# Patient Record
Sex: FEMALE | Race: WHITE | NOT HISPANIC OR LATINO | Employment: FULL TIME | URBAN - METROPOLITAN AREA
[De-identification: names, ages, dates, MRNs, and addresses within clinical notes are randomized per-mention and may not be internally consistent; named-entity substitution may affect disease eponyms.]

---
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
## 2022-01-11 ENCOUNTER — OFFICE VISIT (OUTPATIENT)
Dept: PSYCHOLOGY | Facility: CLINIC | Age: 22
End: 2022-01-11
Payer: COMMERCIAL

## 2022-01-11 DIAGNOSIS — F50.01 ANOREXIA NERVOSA, RESTRICTING TYPE: Primary | ICD-10-CM

## 2022-01-11 DIAGNOSIS — R45.89 DEPRESSED MOOD: ICD-10-CM

## 2022-01-11 PROCEDURE — 90834 PSYTX W PT 45 MINUTES: CPT | Mod: 95,,, | Performed by: PSYCHOLOGIST

## 2022-01-11 PROCEDURE — 90834 PR PSYCHOTHERAPY W/PATIENT, 45 MIN: ICD-10-PCS | Mod: 95,,, | Performed by: PSYCHOLOGIST

## 2022-01-21 NOTE — PROGRESS NOTES
"  Name: Lida Reed YOB: 2000   Gender: Female Age: 21 y.o.   School: [employer]  Date of Appointment: 1/11/2022   Grade: college [employer] Race/Ethnicity: White//White     40-minute session of individual therapy ([id number]) was completed with patient Lida.  She arrived on time.    Reason for Referral  Lida's parents, Mr. Collins and Mrs. Vee Reed, are seeking consultation regarding ongoing needs for Lida.  Lida is known to this writer through two previous psychoeducational evaluations, as well as periodic outpatient therapy sessions with Lida and/or her parents.  Lida has a history of deceitful behavior, and  And Mrs. Reed hypothesize that the function of this behavior is to avoid putting in the necessary effort to complete something that is challenging for her (e.g., a school project or assignment), or at times, to be able to do something that she believes she will not be able to do if she tells the truth about it (e.g., going to a party).   And Mrs. Reed believe that Lida is comfortable doing the "bare minimum" and with being average; they believe that she is not exerting as much effort, particularly toward academic tasks and cross country, as she is capable of.  Indeed Lida also reports that she often "gets bored" when things take a long time, and she feels as though she "just wants them to be over with," such as cross country meets and the ACT (for which she receives accommodations to take one of each of the four parts over four days).  Therapeutic conversations are centered around helping Lida continue to develop insight and self-regulatory skills to translate goals into actionable steps.    As of 2/6/2020 appointment, Lida was referred for CBT by Dr. Mary Walsh due to concerns about mild symptoms of depression, including insomnia, low energy/fatigue, irritability, and difficulty concentrating.  Previous treatment has focused more on behavioral change geared " "toward academic behaviors and executive functioning, as well as family relationships.  Goal for treatment at this time is reduction of depression symptomatology through CBT.    As of October 2021, Lida is participating in family based therapy (FBT) with Dr. Echo Giles at the [facility] for anorexia nervosa, restricting type with potential purging behavior (vomiting of unknown etiology).  She sought out treatment with this writer for supportive care related to the negative impacts on her mood due to the intensity of FBT.  This writer is collaborating care with Dr. Giles.    Content of Current Session  Met with Lida individually for the entirety of the session. She arrived on time for her scheduled appointment.  Lida reported that she has been doing "very good" since her last session. She was able to take a trip to the beach with her boyfriend and his family and has increased her level of socialization more generally, and feels that she has been incredibly successful with maintaining her progress with recovery while also feeling more fulfilled and satisfied to feel as though she is getting closer to her baseline level of social functioning. She discussed her plan for returning to college in person and to staying at her apartment occasionally. Agreed that she can transition to meeting every other week for the next two months because she feels as though her mental health has improved.       The following diagnoses are the reason for psychological intervention:    ICD-10-CM ICD-9-CM   1. Anorexia nervosa, restricting type  F50.01 307.1   2. Depressed mood  R45.89 799.29       Treatment approach: cognitive behavioral therapy  Treatment modality: individual therapy   Plan: Next appointment on 1/25/2022    The patient location is: [place]  The chief complaint leading to consultation is: anorexia nervosa    Visit type: audiovisual    Face to Face time with patient: 40 minutes  45 minutes of total time spent on " the encounter, which includes face to face time and non-face to face time preparing to see the patient (eg, review of tests), Obtaining and/or reviewing separately obtained history, Documenting clinical information in the electronic or other health record, Independently interpreting results (not separately reported) and communicating results to the patient/family/caregiver, or Care coordination (not separately reported).     Each patient to whom he or she provides medical services by telemedicine is:  (1) informed of the relationship between the physician and patient and the respective role of any other health care provider with respect to management of the patient; and (2) notified that he or she may decline to receive medical services by telemedicine and may withdraw from such care at any time.

## 2022-01-25 ENCOUNTER — OFFICE VISIT (OUTPATIENT)
Dept: PSYCHOLOGY | Facility: CLINIC | Age: 22
End: 2022-01-25
Payer: COMMERCIAL

## 2022-01-25 DIAGNOSIS — F50.01 ANOREXIA NERVOSA, RESTRICTING TYPE: Primary | ICD-10-CM

## 2022-01-25 PROCEDURE — 90834 PR PSYCHOTHERAPY W/PATIENT, 45 MIN: ICD-10-PCS | Mod: 95,,, | Performed by: PSYCHOLOGIST

## 2022-01-25 PROCEDURE — 90834 PSYTX W PT 45 MINUTES: CPT | Mod: 95,,, | Performed by: PSYCHOLOGIST

## 2022-01-27 NOTE — PROGRESS NOTES
"  Name: Lida Reed YOB: 2000   Gender: Female Age: 21 y.o.   School: [employer]  Date of Appointment: 1/11/2022   Grade: college [employer] Race/Ethnicity: White//White     40-minute session of individual therapy (22635) was completed with patient Lida.  She arrived on time.    Reason for Referral  Lida's parents, Mr. Collins and Mrs. Vee Reed, are seeking consultation regarding ongoing needs for Lida.  Lida is known to this writer through two previous psychoeducational evaluations, as well as periodic outpatient therapy sessions with Lida and/or her parents.  Lida has a history of deceitful behavior, and  And Mrs. Reed hypothesize that the function of this behavior is to avoid putting in the necessary effort to complete something that is challenging for her (e.g., a school project or assignment), or at times, to be able to do something that she believes she will not be able to do if she tells the truth about it (e.g., going to a party).   And Mrs. Reed believe that Lida is comfortable doing the "bare minimum" and with being average; they believe that she is not exerting as much effort, particularly toward academic tasks and cross country, as she is capable of.  Indeed Lida also reports that she often "gets bored" when things take a long time, and she feels as though she "just wants them to be over with," such as cross country meets and the ACT (for which she receives accommodations to take one of each of the four parts over four days).  Therapeutic conversations are centered around helping Lida continue to develop insight and self-regulatory skills to translate goals into actionable steps.    As of 2/6/2020 appointment, Lida was referred for CBT by Dr. Mary Walsh due to concerns about mild symptoms of depression, including insomnia, low energy/fatigue, irritability, and difficulty concentrating.  Previous treatment has focused more on behavioral change geared " toward academic behaviors and executive functioning, as well as family relationships.  Goal for treatment at this time is reduction of depression symptomatology through CBT.    As of October 2021, Lida is participating in family based therapy (FBT) with Dr. Echo Giles at the [facility] for anorexia nervosa, restricting type with potential purging behavior (vomiting of unknown etiology).  She sought out treatment with this writer for supportive care related to the negative impacts on her mood due to the intensity of FBT.  This writer is collaborating care with Dr. Giles.    Content of Current Session  Met with Lida individually for the entirety of the session. She arrived on time for her scheduled appointment.  Lida shared about a recent 3-night stay at her apartment in [place]. She felt that she did well there overall and enjoyed seeing her friends. However, she also reported feeling homesick and missed her mother, which was a surprise to her. She shared that she also realized that she could send falsified pictures of her meals, and was mildly tempted to do so, but ultimately decided not to. She expressed appropriate insight that she does better when she has to FaceTime or send a video of her meals when she is away from her parents. Lida expressed her intention to share this information with her parents, and she agreed also for this writer to send that prompt to her parents and FBT therapist, Dr. Giles, via email. Praised patient for being open and honest about this.      The following diagnoses are the reason for psychological intervention:    ICD-10-CM ICD-9-CM   1. Anorexia nervosa, restricting type  F50.01 307.1       Treatment approach: cognitive behavioral therapy  Treatment modality: individual therapy   Plan: Next appointment on 2/14/22    The patient location is: [place]  The chief complaint leading to consultation is: anorexia nervosa    Visit type: audiovisual    Face to Face time  with patient: 40 minutes  45 minutes of total time spent on the encounter, which includes face to face time and non-face to face time preparing to see the patient (eg, review of tests), Obtaining and/or reviewing separately obtained history, Documenting clinical information in the electronic or other health record, Independently interpreting results (not separately reported) and communicating results to the patient/family/caregiver, or Care coordination (not separately reported).     Each patient to whom he or she provides medical services by telemedicine is:  (1) informed of the relationship between the physician and patient and the respective role of any other health care provider with respect to management of the patient; and (2) notified that he or she may decline to receive medical services by telemedicine and may withdraw from such care at any time.

## 2022-02-14 ENCOUNTER — PATIENT MESSAGE (OUTPATIENT)
Dept: PSYCHOLOGY | Facility: CLINIC | Age: 22
End: 2022-02-14
Payer: COMMERCIAL

## 2022-03-02 ENCOUNTER — PATIENT MESSAGE (OUTPATIENT)
Dept: PSYCHIATRY | Facility: CLINIC | Age: 22
End: 2022-03-02
Payer: COMMERCIAL

## 2022-03-14 ENCOUNTER — OFFICE VISIT (OUTPATIENT)
Dept: PSYCHOLOGY | Facility: CLINIC | Age: 22
End: 2022-03-14
Payer: COMMERCIAL

## 2022-03-14 DIAGNOSIS — F50.01 ANOREXIA NERVOSA, RESTRICTING TYPE: Primary | ICD-10-CM

## 2022-03-14 PROCEDURE — 90834 PSYTX W PT 45 MINUTES: CPT | Mod: 95,,, | Performed by: PSYCHOLOGIST

## 2022-03-14 PROCEDURE — 90834 PR PSYCHOTHERAPY W/PATIENT, 45 MIN: ICD-10-PCS | Mod: 95,,, | Performed by: PSYCHOLOGIST

## 2022-03-28 ENCOUNTER — TELEPHONE (OUTPATIENT)
Dept: PSYCHOLOGY | Facility: CLINIC | Age: 22
End: 2022-03-28
Payer: COMMERCIAL

## 2022-03-30 ENCOUNTER — TELEPHONE (OUTPATIENT)
Dept: PSYCHOLOGY | Facility: CLINIC | Age: 22
End: 2022-03-30
Payer: COMMERCIAL

## 2022-03-31 ENCOUNTER — OFFICE VISIT (OUTPATIENT)
Dept: PSYCHOLOGY | Facility: CLINIC | Age: 22
End: 2022-03-31
Payer: COMMERCIAL

## 2022-03-31 DIAGNOSIS — F50.01 ANOREXIA NERVOSA, RESTRICTING TYPE: Primary | ICD-10-CM

## 2022-03-31 PROCEDURE — 90837 PR PSYCHOTHERAPY W/PATIENT, 60 MIN: ICD-10-PCS | Mod: 95,,, | Performed by: PSYCHOLOGIST

## 2022-03-31 PROCEDURE — 90837 PSYTX W PT 60 MINUTES: CPT | Mod: 95,,, | Performed by: PSYCHOLOGIST

## 2022-04-25 ENCOUNTER — PATIENT MESSAGE (OUTPATIENT)
Dept: PSYCHOLOGY | Facility: CLINIC | Age: 22
End: 2022-04-25

## 2022-04-25 ENCOUNTER — OFFICE VISIT (OUTPATIENT)
Dept: PSYCHOLOGY | Facility: CLINIC | Age: 22
End: 2022-04-25
Payer: COMMERCIAL

## 2022-04-25 DIAGNOSIS — F50.01 ANOREXIA NERVOSA, RESTRICTING TYPE: Primary | ICD-10-CM

## 2022-04-25 PROCEDURE — 90834 PR PSYCHOTHERAPY W/PATIENT, 45 MIN: ICD-10-PCS | Mod: 95,,, | Performed by: PSYCHOLOGIST

## 2022-04-25 PROCEDURE — 90834 PSYTX W PT 45 MINUTES: CPT | Mod: 95,,, | Performed by: PSYCHOLOGIST

## 2022-05-14 NOTE — PROGRESS NOTES
"  Name: Lida Reed YOB: 2000   Gender: Female Age: 22 y.o.   School: [employer]  Date of Appointment: 4/25/2022   Grade: college [employer] Race/Ethnicity: White//White     45-minute session of individual therapy (67180) was completed with patient Lida.  She arrived on time.    Reason for Referral  Lida's parents, Mr. Collins and Mrs. Vee Reed, are seeking consultation regarding ongoing needs for Lida.  Lida is known to this writer through two previous psychoeducational evaluations, as well as periodic outpatient therapy sessions with Lida and/or her parents.  Lida has a history of deceitful behavior, and  And Mrs. Reed hypothesize that the function of this behavior is to avoid putting in the necessary effort to complete something that is challenging for her (e.g., a school project or assignment), or at times, to be able to do something that she believes she will not be able to do if she tells the truth about it (e.g., going to a party).   And Mrs. Reed believe that Lida is comfortable doing the "bare minimum" and with being average; they believe that she is not exerting as much effort, particularly toward academic tasks and cross country, as she is capable of.  Indeed Lida also reports that she often "gets bored" when things take a long time, and she feels as though she "just wants them to be over with," such as cross country meets and the ACT (for which she receives accommodations to take one of each of the four parts over four days).  Therapeutic conversations are centered around helping Lida continue to develop insight and self-regulatory skills to translate goals into actionable steps.    As of 2/6/2020 appointment, Lida was referred for CBT by Dr. Mary Walsh due to concerns about mild symptoms of depression, including insomnia, low energy/fatigue, irritability, and difficulty concentrating.  Previous treatment has focused more on behavioral change geared " "toward academic behaviors and executive functioning, as well as family relationships.  Goal for treatment at this time is reduction of depression symptomatology through CBT.    As of October 2021, Lida is participating in family based therapy (FBT) with Dr. Echo Giles at the [facility] for anorexia nervosa, restricting type with potential purging behavior (vomiting of unknown etiology).  She sought out treatment with this writer for supportive care related to the negative impacts on her mood due to the intensity of FBT.  This writer is collaborating care with Dr. Giles.    Content of Current Session  Met with Lida individually for the entirety of the session. She arrived on time for her scheduled appointment.  Lida identified conflict with her mother as her biggest new stressor. She asserted that she and her mother have been "getting on each other's nerves a lot again."  Patient continues to report feeling self-efficacious with following through with her treatment goals related to her eating disorder. She has also appreciated increased autonomy in being able to spend more time in [place]. Explored the idea of a family session to discuss transition planning for Lida's treatment, and Lida was open to the idea. She provided her verbal consent for this writer to reach out to her parents to set up that appointment.      The following diagnoses are the reason for psychological intervention:    ICD-10-CM ICD-9-CM   1. Anorexia nervosa, restricting type  F50.01 307.1       Treatment approach: cognitive behavioral therapy  Treatment modality: individual therapy   Plan: Next appointment on 5/19/22    The patient location is: [place]  The chief complaint leading to consultation is: anorexia nervosa    Visit type: audiovisual    Face to Face time with patient: 45 minutes  50 minutes of total time spent on the encounter, which includes face to face time and non-face to face time preparing to see the " patient (eg, review of tests), Obtaining and/or reviewing separately obtained history, Documenting clinical information in the electronic or other health record, Independently interpreting results (not separately reported) and communicating results to the patient/family/caregiver, or Care coordination (not separately reported).     Each patient to whom he or she provides medical services by telemedicine is:  (1) informed of the relationship between the physician and patient and the respective role of any other health care provider with respect to management of the patient; and (2) notified that he or she may decline to receive medical services by telemedicine and may withdraw from such care at any time.

## 2022-05-14 NOTE — PROGRESS NOTES
"  Name: Lida Reed YOB: 2000   Gender: Female Age: 22 y.o.   School: [employer]  Date of Appointment: 3/31/2022   Grade: college [employer] Race/Ethnicity: White//White     53-minute session of individual therapy ([id number]) was completed with patient Lida.  She arrived on time.    Reason for Referral  Lida's parents, Mr. Collins and Mrs. Vee Reed, are seeking consultation regarding ongoing needs for Lida.  Lida is known to this writer through two previous psychoeducational evaluations, as well as periodic outpatient therapy sessions with Lida and/or her parents.  Lida has a history of deceitful behavior, and  And Mrs. Reed hypothesize that the function of this behavior is to avoid putting in the necessary effort to complete something that is challenging for her (e.g., a school project or assignment), or at times, to be able to do something that she believes she will not be able to do if she tells the truth about it (e.g., going to a party).   And Mrs. Reed believe that Lida is comfortable doing the "bare minimum" and with being average; they believe that she is not exerting as much effort, particularly toward academic tasks and cross country, as she is capable of.  Indeed Lida also reports that she often "gets bored" when things take a long time, and she feels as though she "just wants them to be over with," such as cross country meets and the ACT (for which she receives accommodations to take one of each of the four parts over four days).  Therapeutic conversations are centered around helping Lida continue to develop insight and self-regulatory skills to translate goals into actionable steps.    As of 2/6/2020 appointment, Lida was referred for CBT by Dr. Mary Walsh due to concerns about mild symptoms of depression, including insomnia, low energy/fatigue, irritability, and difficulty concentrating.  Previous treatment has focused more on behavioral change geared " "toward academic behaviors and executive functioning, as well as family relationships.  Goal for treatment at this time is reduction of depression symptomatology through CBT.    As of October 2021, Lida is participating in family based therapy (FBT) with Dr. Echo Giles at the [facility] for anorexia nervosa, restricting type with potential purging behavior (vomiting of unknown etiology).  She sought out treatment with this writer for supportive care related to the negative impacts on her mood due to the intensity of FBT.  This writer is collaborating care with Dr. Giles.    Content of Current Session  Met with Lida individually for the entirety of the session. She arrived on time for her scheduled appointment.  Lida discussed her ongoing success with gaining greater independence and maintaining her goal weight. She has been having more academic difficulties lately and feels that she needs to check in with her mother regularly to organize her academic tasks. She noted that she feels having to drive back and forth to [place], as well as doing some classes in person and some virtually, is interfering with her academic functioning. Encouraged her to share this perspective with her parents. Shared with patient that this writer will be moving to [place] and therefore will not be able to see patient after August 2022. Patient expressed appropriate sadness and concern about having to "explain everything" to a new therapist. This writer expressed the perspective that it will be important for Lida to make the decision about whether she feels she needs to continue individual therapy, especially given her mood and emotional functioning have stabilized.      The following diagnoses are the reason for psychological intervention:    ICD-10-CM ICD-9-CM   1. Anorexia nervosa, restricting type  F50.01 307.1       Treatment approach: cognitive behavioral therapy  Treatment modality: individual therapy   Plan: Next " appointment on 4/25/22    The patient location is: [place]  The chief complaint leading to consultation is: anorexia nervosa    Visit type: audiovisual    Face to Face time with patient: 53 minutes  60 minutes of total time spent on the encounter, which includes face to face time and non-face to face time preparing to see the patient (eg, review of tests), Obtaining and/or reviewing separately obtained history, Documenting clinical information in the electronic or other health record, Independently interpreting results (not separately reported) and communicating results to the patient/family/caregiver, or Care coordination (not separately reported).     Each patient to whom he or she provides medical services by telemedicine is:  (1) informed of the relationship between the physician and patient and the respective role of any other health care provider with respect to management of the patient; and (2) notified that he or she may decline to receive medical services by telemedicine and may withdraw from such care at any time.

## 2022-05-14 NOTE — PROGRESS NOTES
"  Name: Lida Reed YOB: 2000   Gender: Female Age: 22 y.o.   School: [employer]  Date of Appointment: 3/14/2022   Grade: college [employer] Race/Ethnicity: White//White     44-minute session of individual therapy ([id number]) was completed with patient Lida.  She arrived on time.    Reason for Referral  Lida's parents, Mr. Collins and Mrs. Vee Reed, are seeking consultation regarding ongoing needs for Lida.  Lida is known to this writer through two previous psychoeducational evaluations, as well as periodic outpatient therapy sessions with Lida and/or her parents.  Lida has a history of deceitful behavior, and  And Mrs. Reed hypothesize that the function of this behavior is to avoid putting in the necessary effort to complete something that is challenging for her (e.g., a school project or assignment), or at times, to be able to do something that she believes she will not be able to do if she tells the truth about it (e.g., going to a party).   And Mrs. Reed believe that Lida is comfortable doing the "bare minimum" and with being average; they believe that she is not exerting as much effort, particularly toward academic tasks and cross country, as she is capable of.  Indeed Lida also reports that she often "gets bored" when things take a long time, and she feels as though she "just wants them to be over with," such as cross country meets and the ACT (for which she receives accommodations to take one of each of the four parts over four days).  Therapeutic conversations are centered around helping Lida continue to develop insight and self-regulatory skills to translate goals into actionable steps.    As of 2/6/2020 appointment, Lida was referred for CBT by Dr. Mary Walhs due to concerns about mild symptoms of depression, including insomnia, low energy/fatigue, irritability, and difficulty concentrating.  Previous treatment has focused more on behavioral change geared " "toward academic behaviors and executive functioning, as well as family relationships.  Goal for treatment at this time is reduction of depression symptomatology through CBT.    As of October 2021, Lida is participating in family based therapy (FBT) with Dr. Echo Giles at the [facility] for anorexia nervosa, restricting type with potential purging behavior (vomiting of unknown etiology).  She sought out treatment with this writer for supportive care related to the negative impacts on her mood due to the intensity of FBT.  This writer is collaborating care with Dr. Giles.    Content of Current Session  Met with Lida individually for the entirety of the session. She arrived on time for her scheduled appointment.  Lida stated that she has been doing well with her eating disorder treatment and has been maintaining her goal weight. She indicated that she has been getting along better with her mother and feels that she is working toward some increased independence. She still feels as though she "needs the help" from her parents with making decisions, particularly regarding academics.      The following diagnoses are the reason for psychological intervention:    ICD-10-CM ICD-9-CM   1. Anorexia nervosa, restricting type  F50.01 307.1       Treatment approach: cognitive behavioral therapy  Treatment modality: individual therapy   Plan: Next appointment on 3/31/22    The patient location is: [place]  The chief complaint leading to consultation is: anorexia nervosa    Visit type: audiovisual    Face to Face time with patient: 45 minutes  50 minutes of total time spent on the encounter, which includes face to face time and non-face to face time preparing to see the patient (eg, review of tests), Obtaining and/or reviewing separately obtained history, Documenting clinical information in the electronic or other health record, Independently interpreting results (not separately reported) and communicating results to " the patient/family/caregiver, or Care coordination (not separately reported).     Each patient to whom he or she provides medical services by telemedicine is:  (1) informed of the relationship between the physician and patient and the respective role of any other health care provider with respect to management of the patient; and (2) notified that he or she may decline to receive medical services by telemedicine and may withdraw from such care at any time.

## 2022-05-19 ENCOUNTER — OFFICE VISIT (OUTPATIENT)
Dept: PSYCHOLOGY | Facility: CLINIC | Age: 22
End: 2022-05-19
Payer: COMMERCIAL

## 2022-05-19 DIAGNOSIS — F50.01 ANOREXIA NERVOSA, RESTRICTING TYPE: Primary | ICD-10-CM

## 2022-05-19 PROCEDURE — 90847 PR FAMILY PSYCHOTHERAPY W/ PT, 50 MIN: ICD-10-PCS | Mod: S$GLB,,, | Performed by: PSYCHOLOGIST

## 2022-05-19 PROCEDURE — 90847 FAMILY PSYTX W/PT 50 MIN: CPT | Mod: S$GLB,,, | Performed by: PSYCHOLOGIST

## 2022-05-20 ENCOUNTER — PATIENT MESSAGE (OUTPATIENT)
Dept: PSYCHOLOGY | Facility: CLINIC | Age: 22
End: 2022-05-20
Payer: COMMERCIAL

## 2022-05-21 NOTE — PROGRESS NOTES
"  Name: Lida Reed YOB: 2000   Gender: Female Age: 22 y.o.   School: [employer]  Date of Appointment: 5/19/2022   Grade: college [employer] Race/Ethnicity: White//White     60-minute session of family therapy ([id number]) was completed with patient Lida and her parents, Dennis and Vee Reed.  She arrived on time.    Reason for Referral  Lida's parents, Mr. Collins and Mrs. Vee Reed, are seeking consultation regarding ongoing needs for Lida.  Lida is known to this writer through two previous psychoeducational evaluations, as well as periodic outpatient therapy sessions with Lida and/or her parents.  Lida has a history of deceitful behavior, and  And Mrs. Reed hypothesize that the function of this behavior is to avoid putting in the necessary effort to complete something that is challenging for her (e.g., a school project or assignment), or at times, to be able to do something that she believes she will not be able to do if she tells the truth about it (e.g., going to a party).   And Mrs. Reed believe that Lida is comfortable doing the "bare minimum" and with being average; they believe that she is not exerting as much effort, particularly toward academic tasks and cross country, as she is capable of.  Indeed Lida also reports that she often "gets bored" when things take a long time, and she feels as though she "just wants them to be over with," such as cross country meets and the ACT (for which she receives accommodations to take one of each of the four parts over four days).  Therapeutic conversations are centered around helping Lida continue to develop insight and self-regulatory skills to translate goals into actionable steps.    As of 2/6/2020 appointment, Lida was referred for CBT by Dr. Mary Walsh due to concerns about mild symptoms of depression, including insomnia, low energy/fatigue, irritability, and difficulty concentrating.  Previous treatment has " "focused more on behavioral change geared toward academic behaviors and executive functioning, as well as family relationships.  Goal for treatment at this time is reduction of depression symptomatology through CBT.    As of October 2021, Lida is participating in family based therapy (FBT) with Dr. Echo Giles at the [facility] for anorexia nervosa, restricting type with potential purging behavior (vomiting of unknown etiology).  She sought out treatment with this writer for supportive care related to the negative impacts on her mood due to the intensity of FBT.  This writer is collaborating care with Dr. Giles.    Content of Current Session  At this writer's request, met with  And Mrs. Reed individually for the first 35 minutes of the session. This writer wanted to explore with them their tolerance for allowing Lida to make the decision independently about whether she wants to continue therapy or not, even if she decides not to and it means that she backslides emotionally. This writer presented the idea that the natural consequences of that may be important for Lida to experience so that she can increase her independent decision-making skills. Patient's parents were in agreement that patient should make the decision. Lida joined for the last 25 minutes of the session. She iterated that she wants to continue therapy because she feels she "still has some things to work on."  Discussed variables that she may want to consider when identifying a new therapist, such as location, availability, etc. Patient stated that she will weigh her options and reach out to this writer if assistance is needed. Will plan to meet with patient 1-2 more times virtually before terminating with this writer.    The following diagnoses are the reason for psychological intervention:    ICD-10-CM ICD-9-CM   1. Anorexia nervosa, restricting type  F50.01 307.1       Treatment approach: cognitive behavioral therapy  Treatment " modality: individual therapy   Plan: Next appointment to be scheduled in [place]

## 2022-06-02 ENCOUNTER — PATIENT MESSAGE (OUTPATIENT)
Dept: PSYCHOLOGY | Facility: CLINIC | Age: 22
End: 2022-06-02
Payer: COMMERCIAL

## 2022-06-29 ENCOUNTER — TELEPHONE (OUTPATIENT)
Dept: PRIMARY CARE CLINIC | Facility: CLINIC | Age: 22
End: 2022-06-29
Payer: COMMERCIAL

## 2022-07-22 ENCOUNTER — OFFICE VISIT (OUTPATIENT)
Dept: PSYCHOLOGY | Facility: CLINIC | Age: 22
End: 2022-07-22
Payer: COMMERCIAL

## 2022-07-22 DIAGNOSIS — F50.00 ANOREXIA NERVOSA: Primary | ICD-10-CM

## 2022-07-22 PROCEDURE — 90834 PR PSYCHOTHERAPY W/PATIENT, 45 MIN: ICD-10-PCS | Mod: S$GLB,,, | Performed by: PSYCHOLOGIST

## 2022-07-22 PROCEDURE — 90834 PSYTX W PT 45 MINUTES: CPT | Mod: S$GLB,,, | Performed by: PSYCHOLOGIST

## 2022-07-25 NOTE — PROGRESS NOTES
.       Psychology Outpatient Clinic  Outpatient Therapy Progress Note  Telehealth               Lida Reed     Date of Visit:   7/22/2022  YOB: 2000   Diagnosis:   Anorexia Nervosa; Unspecified Depressed mood  Referring Provider: Dr. Giles   Time Seen:  1:05-2:00 PM  Billing code(s): 79994         Progress Note  Dagmar presented for her scheduled appointment to address eating disorder, anxiety, and depressed mood.  Psychologist discussed limits of confidentiality.  Assessed current mood, emotional, and behavioral functioning.  Discussed goals of psychotherapy.  Patient expressed interest in supportive therapy with goals of reducing anxiety and feeling supported through treatment for her eating disorder.  Patient discussed stressors including psychosocial stress and stress related to eating disorder specifically.  Provided supportive psychotherapy.  No suicidal ideation/intent reported or endorsed.    Assessment:  Lida is experiencing stress related to psychosocial stress and would benefit from continued supportive psychotherapy.  She currently meets criteria for Anorexia Nervosa. Will continue to assess depressed mood and anxiety.  No current risk of suicidal ideation or self-harm.    Plan:  Will continue weekly supportive psychotherapy. Next session planned for 7/27/22.      Johanne Jeter, PhD, ABPP  Licensed & Board Certified Clinical Psychologist

## 2022-07-27 ENCOUNTER — OFFICE VISIT (OUTPATIENT)
Dept: PSYCHOLOGY | Facility: CLINIC | Age: 22
End: 2022-07-27
Payer: COMMERCIAL

## 2022-07-27 DIAGNOSIS — F50.01 ANOREXIA NERVOSA, RESTRICTING TYPE: Primary | ICD-10-CM

## 2022-07-27 PROBLEM — F50.019 ANOREXIA NERVOSA, RESTRICTING TYPE: Status: ACTIVE | Noted: 2022-07-27

## 2022-07-27 PROCEDURE — 90834 PSYTX W PT 45 MINUTES: CPT | Mod: S$GLB,,, | Performed by: PSYCHOLOGIST

## 2022-07-27 PROCEDURE — 90834 PR PSYCHOTHERAPY W/PATIENT, 45 MIN: ICD-10-PCS | Mod: S$GLB,,, | Performed by: PSYCHOLOGIST

## 2022-07-27 NOTE — PROGRESS NOTES
".       Psychology Outpatient Clinic  Outpatient Therapy Progress Note  Telehealth               Lida Reed     Date of Visit:   7/27/2022  YOB: 2000   Diagnosis:   Anorexia Nervosa, restricting type; Unspecified Depressed mood  Time Seen:  9:00-9:50 AM  Billing code(s): 39454         Progress Note  Dagmar presented early for her scheduled appointment to address eating disorder, anxiety, and depressed mood.  Lida reported that in the past week, she engaged in discussion of her progress and treatment planning for Anorexia with her parents and Dr. Giles.  She is currently planning to admit to inpatient residential treatment for Anorexia at [facility] in [place] due to difficulties with making progress in FBT at home.  This psychologist provided supportive psychotherapy and engaged in problem-solving within a CBT framework around anxiety and worries related to admission to residential treatment.  Lida was able to frame these worries in a growth mindset, and she is feeling motivated and ready to engage in treatment at [facility].  Lida continues to endorse mood that is "sangita" and denies any thoughts or behaviors of suicidality.      Assessment:  Lida is experiencing stress related to psychosocial stress and would benefit from continued supportive psychotherapy.  She currently meets criteria for Anorexia Nervosa. Will continue to address anxiety and depressed mood in individual psychotherapy leading upto and following discharge from inpatient residential treatment for Anorexia.  No current risk of suicidal ideation or self-harm.    Plan:  Lida will contact the psychology clinic to schedule future visits depending on her admission status to residential treatment.      Johanne Jeter, PhD, ABPP  Licensed & Board Certified Clinical Psychologist        "

## 2022-10-06 ENCOUNTER — PATIENT MESSAGE (OUTPATIENT)
Dept: PSYCHOLOGY | Facility: CLINIC | Age: 22
End: 2022-10-06
Payer: COMMERCIAL

## 2022-10-25 ENCOUNTER — OFFICE VISIT (OUTPATIENT)
Dept: PSYCHOLOGY | Facility: CLINIC | Age: 22
End: 2022-10-25
Payer: COMMERCIAL

## 2022-10-25 DIAGNOSIS — F50.01 ANOREXIA NERVOSA, RESTRICTING TYPE: Primary | ICD-10-CM

## 2022-10-25 DIAGNOSIS — F90.0 ADHD, PREDOMINANTLY INATTENTIVE TYPE: ICD-10-CM

## 2022-10-25 PROCEDURE — 90785 PR INTERACTIVE COMPLEXITY: ICD-10-PCS | Mod: S$GLB,,, | Performed by: PSYCHOLOGIST

## 2022-10-25 PROCEDURE — 90785 PSYTX COMPLEX INTERACTIVE: CPT | Mod: S$GLB,,, | Performed by: PSYCHOLOGIST

## 2022-10-25 PROCEDURE — 96127 BRIEF EMOTIONAL/BEHAV ASSMT: CPT | Mod: S$GLB,,, | Performed by: PSYCHOLOGIST

## 2022-10-25 PROCEDURE — 90837 PSYTX W PT 60 MINUTES: CPT | Mod: S$GLB,,, | Performed by: PSYCHOLOGIST

## 2022-10-25 PROCEDURE — 90837 PR PSYCHOTHERAPY W/PATIENT, 60 MIN: ICD-10-PCS | Mod: S$GLB,,, | Performed by: PSYCHOLOGIST

## 2022-10-25 PROCEDURE — 96127 PR BRIEF EMOTIONAL/BEHAV ASSMT: ICD-10-PCS | Mod: S$GLB,,, | Performed by: PSYCHOLOGIST

## 2022-11-08 ENCOUNTER — OFFICE VISIT (OUTPATIENT)
Dept: PSYCHOLOGY | Facility: CLINIC | Age: 22
End: 2022-11-08
Payer: COMMERCIAL

## 2022-11-08 DIAGNOSIS — F50.01 ANOREXIA NERVOSA, RESTRICTING TYPE: Primary | ICD-10-CM

## 2022-11-08 DIAGNOSIS — F90.0 ADHD, PREDOMINANTLY INATTENTIVE TYPE: ICD-10-CM

## 2022-11-08 PROCEDURE — 90785 PR INTERACTIVE COMPLEXITY: ICD-10-PCS | Mod: S$GLB,,, | Performed by: PSYCHOLOGIST

## 2022-11-08 PROCEDURE — 90837 PSYTX W PT 60 MINUTES: CPT | Mod: S$GLB,,, | Performed by: PSYCHOLOGIST

## 2022-11-08 PROCEDURE — 90785 PSYTX COMPLEX INTERACTIVE: CPT | Mod: S$GLB,,, | Performed by: PSYCHOLOGIST

## 2022-11-08 PROCEDURE — 90837 PR PSYCHOTHERAPY W/PATIENT, 60 MIN: ICD-10-PCS | Mod: S$GLB,,, | Performed by: PSYCHOLOGIST

## 2022-11-08 NOTE — PROGRESS NOTES
.       Psychology Outpatient Clinic  Outpatient Therapy Progress Note  Telehealth               Lida Reed     Date of Visit:   10/25/2022  YOB: 2000   Diagnosis:   Anorexia Nervosa, restricting type; ADHD; Unspecified Depressed mood  Time Seen:  9:00-10:00 AM  Billing code(s):  93433, [id number], [id number]         Progress Note  Dagmar presented on time for her scheduled appointment to address eating disorder, anxiety, and depressed mood.  This is Lida's first psychotherapy session following residential and PHP treatment for Anorexia at [facility].  Lida is currently seeking psychtherapy to manage emotional and stress symptoms as well as to support her in her eating disorder recovery for Anorexia Nervosa.  She is planning to meet with a new dietician this week.  She is also enrolled in [employer] 3 days per week through [facility].  In session, Lida processed her experiences in recovery and treatment of her Anorexia at [facility].  She currently feels that she is doing well and is looking forward to being back home.  She is currently planning to being working part-time with hopes of re-enrolling in classes in the Spring.  She is motivated to participate in psychotherapy.  She denies any thoughts or behaviors of suicidality.      TREATMENT MODALITIES USED:  CBT, supportive psychotherapy    INTERACTIVE COMPLEXITY EXPLANATION:  This session involved Interactive Complexity (73283); that is, specific communication factors complicated the delivery of the procedure.  Specifically, evaluation participant emotions interfered with understanding and ability to assist with providing information about the patient.    EMOTIONAL/BEHAVIORAL INSTRUMENTS:  PHQ-9 = 5, item 9 = 0 (mild depression, no SI)  THERESA-7 = 6 (mild anxiety)    Assessment:  Lida is experiencing anxiety and depressed mood in addition Anorexia Nervosa.  She has made good progress in recovery of AN and currently feels that she is able to work her treatment plan.   However, progress is new and she will benefit from continue psychotherapy to support her recovery and process and manage anxiety and depressed mood.  Currently, she is at minimal risk of suicidal ideation or self-harm and denies thoughts of SI.    Plan:  Lida will restart weekly psychotherapy.      Johanne Jeter, PhD, ABPP  Licensed & Board Certified Clinical Psychologist

## 2022-11-08 NOTE — PROGRESS NOTES
.       Psychology Outpatient Clinic  Outpatient Therapy Progress Note  Telehealth               Lida Reed     Date of Visit:   11/8/2022   YOB: 2000   Diagnosis:   Anorexia Nervosa, restricting type; ADHD; Unspecified Depressed mood  Time Seen:  9:05-10:10 AM  Billing code(s):  34812, 70715         Progress Note  Dagmar presented on time for her scheduled appointment to address eating disorder, anxiety, and depressed mood.  She reports that she has been doing well keeping up with her appointments and treatment goals since returning home from [facility].  Missed last week due to not having an appointment scheduled.  She is currently working on goals of balancing work, treatment, and integrating social engagement with peers.  Weights are currently being taking by dietician via blind weight scale at home, and Lida has signed an DELLA for this psychologist and dietician to communicate for treatment purposes.  Discussed stress management skills and self-monitoring.  Lida is motivated to participate in psychotherapy.  She denies any thoughts or behaviors of suicidality.      TREATMENT MODALITIES USED:  CBT, supportive psychotherapy    INTERACTIVE COMPLEXITY EXPLANATION:  This session involved Interactive Complexity (42117); that is, specific communication factors complicated the delivery of the procedure.  Specifically, evaluation participant emotions interfered with understanding and ability to assist with providing information about the patient.      Assessment:  Lida is experiencing anxiety and depressed mood in addition Anorexia Nervosa.  She has made good progress in recovery of AN and currently feels that she is able to work her treatment plan.  However, progress is new and she will benefit from continue psychotherapy to support her recovery and process and manage anxiety and depressed mood.  Currently, she is at minimal risk of suicidal ideation or self-harm and denies thoughts of SI.    Plan:   Lida will continue weekly psychotherapy for anorexia.      Johanne Jeter, PhD, ABPP  Licensed & Board Certified Clinical Psychologist

## 2022-11-10 ENCOUNTER — OFFICE VISIT (OUTPATIENT)
Dept: PSYCHIATRY | Facility: CLINIC | Age: 22
End: 2022-11-10
Payer: COMMERCIAL

## 2022-11-10 VITALS
DIASTOLIC BLOOD PRESSURE: 70 MMHG | WEIGHT: 118.63 LBS | HEART RATE: 77 BPM | BODY MASS INDEX: 20.36 KG/M2 | SYSTOLIC BLOOD PRESSURE: 120 MMHG

## 2022-11-10 DIAGNOSIS — F41.9 ANXIETY: ICD-10-CM

## 2022-11-10 DIAGNOSIS — F50.01 ANOREXIA NERVOSA, RESTRICTING TYPE: Primary | ICD-10-CM

## 2022-11-10 DIAGNOSIS — F32.A DEPRESSION, UNSPECIFIED DEPRESSION TYPE: ICD-10-CM

## 2022-11-10 PROCEDURE — 90792 PR PSYCHIATRIC DIAGNOSTIC EVALUATION W/MEDICAL SERVICES: ICD-10-PCS | Mod: S$GLB,,, | Performed by: PSYCHIATRY & NEUROLOGY

## 2022-11-10 PROCEDURE — 3078F PR MOST RECENT DIASTOLIC BLOOD PRESSURE < 80 MM HG: ICD-10-PCS | Mod: CPTII,S$GLB,, | Performed by: PSYCHIATRY & NEUROLOGY

## 2022-11-10 PROCEDURE — 3008F PR BODY MASS INDEX (BMI) DOCUMENTED: ICD-10-PCS | Mod: CPTII,S$GLB,, | Performed by: PSYCHIATRY & NEUROLOGY

## 2022-11-10 PROCEDURE — 3008F BODY MASS INDEX DOCD: CPT | Mod: CPTII,S$GLB,, | Performed by: PSYCHIATRY & NEUROLOGY

## 2022-11-10 PROCEDURE — 1159F MED LIST DOCD IN RCRD: CPT | Mod: CPTII,S$GLB,, | Performed by: PSYCHIATRY & NEUROLOGY

## 2022-11-10 PROCEDURE — 1160F RVW MEDS BY RX/DR IN RCRD: CPT | Mod: CPTII,S$GLB,, | Performed by: PSYCHIATRY & NEUROLOGY

## 2022-11-10 PROCEDURE — 90792 PSYCH DIAG EVAL W/MED SRVCS: CPT | Mod: S$GLB,,, | Performed by: PSYCHIATRY & NEUROLOGY

## 2022-11-10 PROCEDURE — 1160F PR REVIEW ALL MEDS BY PRESCRIBER/CLIN PHARMACIST DOCUMENTED: ICD-10-PCS | Mod: CPTII,S$GLB,, | Performed by: PSYCHIATRY & NEUROLOGY

## 2022-11-10 PROCEDURE — 3074F SYST BP LT 130 MM HG: CPT | Mod: CPTII,S$GLB,, | Performed by: PSYCHIATRY & NEUROLOGY

## 2022-11-10 PROCEDURE — 3078F DIAST BP <80 MM HG: CPT | Mod: CPTII,S$GLB,, | Performed by: PSYCHIATRY & NEUROLOGY

## 2022-11-10 PROCEDURE — 99999 PR PBB SHADOW E&M-EST. PATIENT-LVL III: CPT | Mod: PBBFAC,,, | Performed by: PSYCHIATRY & NEUROLOGY

## 2022-11-10 PROCEDURE — 1159F PR MEDICATION LIST DOCUMENTED IN MEDICAL RECORD: ICD-10-PCS | Mod: CPTII,S$GLB,, | Performed by: PSYCHIATRY & NEUROLOGY

## 2022-11-10 PROCEDURE — 99999 PR PBB SHADOW E&M-EST. PATIENT-LVL III: ICD-10-PCS | Mod: PBBFAC,,, | Performed by: PSYCHIATRY & NEUROLOGY

## 2022-11-10 PROCEDURE — 3074F PR MOST RECENT SYSTOLIC BLOOD PRESSURE < 130 MM HG: ICD-10-PCS | Mod: CPTII,S$GLB,, | Performed by: PSYCHIATRY & NEUROLOGY

## 2022-11-10 RX ORDER — OLANZAPINE 5 MG/1
5 TABLET ORAL NIGHTLY
Qty: 30 TABLET | Refills: 1 | Status: SHIPPED | OUTPATIENT
Start: 2022-11-10 | End: 2023-11-10

## 2022-11-10 RX ORDER — FLUOXETINE HYDROCHLORIDE 20 MG/1
20 CAPSULE ORAL DAILY
Qty: 30 CAPSULE | Refills: 1 | Status: SHIPPED | OUTPATIENT
Start: 2022-11-10 | End: 2023-11-10

## 2022-11-10 NOTE — PROGRESS NOTES
"Outpatient Psychiatry Initial Visit (MD/NP)    11/10/2022    Lida Reed, a 22 y.o. female, presenting for initial evaluation visit. Met with patient.    Reason for Encounter: self-referral. Patient complains of   Chief Complaint   Patient presents with    Distractibilty    Eating Disorder   .    History of Present Illness: Additional Complaints:  Pt here for follow up of eating disorder as treated elsewhere . Her care will be assumed by Dr Roselyn Douglas   Here today for 2 refills .    Daughter of Bimal and Dennis Reed (  [facility] [employer] ;  [facility])   Pt is s/p graduation from [facility] in [place]  after a  2 month stay . Pt has doing Family based therapy without success for the past year and felt  she d needed a higher level of care.  Echo Giles with the [facility] in [place]  had recommended that facility .  Dx'd  with anorexia ( restriction type)  1 year ago .    Dx 'd with depression 3 years ago  then eating recovery added "Anxiety" .  She sees Johanne Glover , PhD  for general therapy with focus on eating disorders . She also sees Stefani Swanson who is a private practice dietician .        Graduated from [email or web address] in 2020.  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    To return to [employer] in Jan in her own apartment alone as past roommates were not good influences with her recovery . Has them still as  friends .       Psychiatric Review Of Systems - Is patient experiencing or having changes in:  sleep: no  appetite: no,  3 meals , 3 snacks   weight: no, satisfactory - does not care to  know weight only trends of trending up/ down or stable   energy/anergy: no  interest/pleasure/anhedonia: no  somatic symptoms: no  libido: abstinent   anxiety/panic: no  guilty/hopelessness: no  concentration: no  S.I.B.s/risky behavior: no  Irritability: no  Racing thoughts: no  Impulsive behaviors: " no  Paranoia: no  AVH: no     PMH ; healthy     Past psych hx :  ADHD   No past meds other than ADHD meds ( no longer on )     No acute inpatient admits   No suicide attempts       Family hx   Sister has hx of anorexia - did FBT with pt and has overcome - sister healthy     Social Hx   Adopted - no known family hx   Yazdanism   Heterosexual - dating local boy who attends [employer]  Former vaper   Alcohol- never regularly , none now   Drug mj experimentation in the past         Current meds :  Prozac 20 mg  qday ; olanzapine 5 mg qhs  for sleep   Warned about about risk of Tardive dyskinesia     Review Of Systems:     GENERAL:  No weight gain or loss  SKIN:  No rashes or lacerations  HEAD:  No headaches  EYES:  No exophthalmos, jaundice or blindness  EARS:  No dizziness, tinnitus or hearing loss  NOSE:  No changes in smell  MOUTH & THROAT:  No dyskinetic movements or obvious goiter  CHEST:  No shortness of breath, hyperventilation or cough  CARDIOVASCULAR:  No tachycardia or chest pain  ABDOMEN:  No nausea, vomiting, pain, constipation or diarrhea  URINARY:  No frequency, dysuria or sexual dysfunction  ENDOCRINE:  No polydipsia, polyuria  MUSCULOSKELETAL:  No pain or stiffness of the joints  NEUROLOGIC:  No weakness, sensory changes, seizures, confusion, memory loss, tremor or other abnormal movements    Current Evaluation:     Nutritional Screening: Considering the patient's height and weight, medications, medical history and preferences, should a referral be made to the dietitian? no    Constitutional  Vitals:  Most recent vital signs, dated less than 90 days prior to this appointment, were reviewed.    Vitals:    11/10/22 1355   BP: 120/70   Pulse: 77   Weight: 53.8 kg (118 lb 9.7 oz)        General:  unremarkable, age appropriate, casually dressed, neatly groomed     Musculoskeletal  Muscle Strength/Tone:  no tremor, no tic, no atrophy   Gait & Station:  non-ataxic     Psychiatric  Speech:  no latency; no press   Mood &  "Affect:  "Pretty stable and present "   congruent and appropriate   Thought Process:  normal and logical, goal-directed   Associations:  intact   Thought Content:  normal, no suicidality, no homicidality, delusions, or paranoia   Insight:  has awareness of illness   Judgement: behavior is adequate to circumstances   Orientation:  grossly intact   Memory: intact for content of interview   Language: grossly intact   Attention Span & Concentration:  able to focus   Fund of Knowledge:  intact and appropriate to age and level of education       Relevant Elements of Neurological Exam: normal gait    Functioning in Relationships:  Spouse/partner: single   Peers: has friends   Employers: 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   Latest known visit with results is:   Admission on 11/29/2021, Discharged on 11/29/2021   Component Date Value Ref Range Status    POC Rapid COVID 11/29/2021 Negative  Negative Final     Acceptable 11/29/2021 Yes   Final    POC Molecular Influenza A Ag 11/29/2021 Positive (A)  Negative, Not Reported Final    POC Molecular Influenza B Ag 11/29/2021 Negative  Negative, Not Reported Final     Acceptable 11/29/2021 Yes   Final    WBC 11/29/2021 7.29  3.90 - 12.70 K/uL Final    RBC 11/29/2021 4.89  4.00 - 5.40 M/uL Final    Hemoglobin 11/29/2021 14.7  12.0 - 16.0 g/dL Final    Hematocrit 11/29/2021 45.3  37.0 - 48.5 % Final    MCV 11/29/2021 93  82 - 98 fL Final    MCH 11/29/2021 30.1  27.0 - 31.0 pg Final    MCHC 11/29/2021 32.5  32.0 - 36.0 g/dL Final    RDW 11/29/2021 12.6  11.5 - 14.5 % Final    Platelets 11/29/2021 206  150 - 450 K/uL Final    MPV 11/29/2021 9.6  9.2 - 12.9 fL Final    Immature Granulocytes 11/29/2021 0.8 (H)  0.0 - 0.5 % Final    Gran # (ANC) 11/29/2021 5.3  1.8 - 7.7 K/uL Final    Immature Grans (Abs) 11/29/2021 0.06 (H)  0.00 - 0.04 K/uL Final    Lymph # " 11/29/2021 0.9 (L)  1.0 - 4.8 K/uL Final    Mono # 11/29/2021 0.9  0.3 - 1.0 K/uL Final    Eos # 11/29/2021 0.0  0.0 - 0.5 K/uL Final    Baso # 11/29/2021 0.04  0.00 - 0.20 K/uL Final    nRBC 11/29/2021 0  0 /100 WBC Final    Gran % 11/29/2021 72.7  38.0 - 73.0 % Final    Lymph % 11/29/2021 12.9 (L)  18.0 - 48.0 % Final    Mono % 11/29/2021 12.6  4.0 - 15.0 % Final    Eosinophil % 11/29/2021 0.5  0.0 - 8.0 % Final    Basophil % 11/29/2021 0.5  0.0 - 1.9 % Final    Platelet Estimate 11/29/2021 Appears normal   Final    Differential Method 11/29/2021 Automated   Final    Sodium 11/29/2021 136  136 - 145 mmol/L Final    Potassium 11/29/2021 4.0  3.5 - 5.1 mmol/L Final    Chloride 11/29/2021 104  95 - 110 mmol/L Final    CO2 11/29/2021 23  23 - 29 mmol/L Final    Glucose 11/29/2021 97  70 - 110 mg/dL Final    BUN 11/29/2021 10  6 - 20 mg/dL Final    Creatinine 11/29/2021 0.6  0.5 - 1.4 mg/dL Final    Calcium 11/29/2021 9.0  8.7 - 10.5 mg/dL Final    Anion Gap 11/29/2021 9  8 - 16 mmol/L Final    eGFR if African American 11/29/2021 >60.0  >60 mL/min/1.73 m^2 Final    eGFR if non African American 11/29/2021 >60.0  >60 mL/min/1.73 m^2 Final    POC Preg Test, Ur 11/29/2021 Negative  Negative Final     Acceptable 11/29/2021 Yes   Final    D-Dimer 11/29/2021 0.35  <0.50 mg/L FEU Final         Medications  Outpatient Encounter Medications as of 11/10/2022   Medication Sig Dispense Refill    amantadine HCL (SYMMETREL) 100 mg capsule Take 1 capsule (100 mg total) by mouth 2 (two) times daily. At morning and later afternoon 60 capsule 1    progesterone (PROMETRIUM) 100 MG capsule Take 3 capsules by mouth at bedtime from cycle day 14-27 (Patient not taking: Reported on 11/19/2021) 42 capsule 6    triamcinolone acetonide 0.1% (KENALOG) 0.1 % ointment apply to affected area every day to twice a day after moisturizing. Not more than 2 weeks straight in same location. Avoid face and groin 45 g 1     No  facility-administered encounter medications on file as of 11/10/2022.           Assessment - Diagnosis - Goals:     Impression: new pt to me prior to her becoming pt of Dr Sol Douglas       ICD-10-CM ICD-9-CM   1. Anorexia nervosa, restricting type  F50.01 307.1   2. Anxiety  F41.9 300.00   3. Depression, unspecified depression type  F32.A 311       Strengths and Liabilities: Strength: Patient accepts guidance/feedback, Strength: Patient is expressive/articulate., Strength: Patient is intelligent., Strength: Patient is motivated for change., Strength: Patient is physically healthy., Strength: Patient has positive support network., Strength: Patient has reasonable judgment., Strength: Patient is stable.    Treatment Goals:  Specify outcomes written in observable, behavioral terms:   Anxiety: reducing physical symptoms of anxiety  Maintain healthy eating pattern     Treatment Plan/Recommendations:   Medication Management: Continue current medications. The risks and benefits of medication were discussed with the patient.  Refilled olanzapine 5 mg qhs   Refilled Prozac 20 mg q day   Referral for further treatment to Dr Arnold   The treatment plan and follow up plan were reviewed with the patient.      Return to Clinic: 3 weeks    Counseling time: 30 mins   Total time: 45 mins

## 2022-11-15 ENCOUNTER — OFFICE VISIT (OUTPATIENT)
Dept: PSYCHOLOGY | Facility: CLINIC | Age: 22
End: 2022-11-15
Payer: COMMERCIAL

## 2022-11-15 DIAGNOSIS — F50.01 ANOREXIA NERVOSA, RESTRICTING TYPE: Primary | ICD-10-CM

## 2022-11-15 DIAGNOSIS — F90.0 ADHD, PREDOMINANTLY INATTENTIVE TYPE: ICD-10-CM

## 2022-11-15 PROCEDURE — 99999 PR PBB SHADOW E&M-EST. PATIENT-LVL I: CPT | Mod: PBBFAC,,, | Performed by: PSYCHOLOGIST

## 2022-11-15 PROCEDURE — 90834 PR PSYCHOTHERAPY W/PATIENT, 45 MIN: ICD-10-PCS | Mod: S$GLB,,, | Performed by: PSYCHOLOGIST

## 2022-11-15 PROCEDURE — 99999 PR PBB SHADOW E&M-EST. PATIENT-LVL I: ICD-10-PCS | Mod: PBBFAC,,, | Performed by: PSYCHOLOGIST

## 2022-11-15 PROCEDURE — 90785 PSYTX COMPLEX INTERACTIVE: CPT | Mod: S$GLB,,, | Performed by: PSYCHOLOGIST

## 2022-11-15 PROCEDURE — 90785 PR INTERACTIVE COMPLEXITY: ICD-10-PCS | Mod: S$GLB,,, | Performed by: PSYCHOLOGIST

## 2022-11-15 PROCEDURE — 90834 PSYTX W PT 45 MINUTES: CPT | Mod: S$GLB,,, | Performed by: PSYCHOLOGIST

## 2022-11-27 ENCOUNTER — OFFICE VISIT (OUTPATIENT)
Dept: URGENT CARE | Facility: CLINIC | Age: 22
End: 2022-11-27
Payer: COMMERCIAL

## 2022-11-27 VITALS
RESPIRATION RATE: 18 BRPM | BODY MASS INDEX: 20.14 KG/M2 | HEART RATE: 97 BPM | DIASTOLIC BLOOD PRESSURE: 71 MMHG | OXYGEN SATURATION: 98 % | WEIGHT: 118 LBS | TEMPERATURE: 98 F | SYSTOLIC BLOOD PRESSURE: 121 MMHG | HEIGHT: 64 IN

## 2022-11-27 DIAGNOSIS — S09.90XA TRAUMATIC INJURY OF HEAD, INITIAL ENCOUNTER: ICD-10-CM

## 2022-11-27 DIAGNOSIS — S00.83XA CONTUSION OF FACE, INITIAL ENCOUNTER: ICD-10-CM

## 2022-11-27 DIAGNOSIS — S06.0X0A CONCUSSION WITHOUT LOSS OF CONSCIOUSNESS, INITIAL ENCOUNTER: Primary | ICD-10-CM

## 2022-11-27 DIAGNOSIS — Z20.828 EXPOSURE TO THE FLU: ICD-10-CM

## 2022-11-27 LAB
CTP QC/QA: YES
POC MOLECULAR INFLUENZA A AGN: NEGATIVE
POC MOLECULAR INFLUENZA B AGN: NEGATIVE

## 2022-11-27 PROCEDURE — 70150 XR FACIAL BONES 3 OR MORE VIEW: ICD-10-PCS | Mod: S$GLB,,, | Performed by: RADIOLOGY

## 2022-11-27 PROCEDURE — 70150 X-RAY EXAM OF FACIAL BONES: CPT | Mod: S$GLB,,, | Performed by: RADIOLOGY

## 2022-11-27 PROCEDURE — 3074F SYST BP LT 130 MM HG: CPT | Mod: CPTII,S$GLB,, | Performed by: NURSE PRACTITIONER

## 2022-11-27 PROCEDURE — 3078F DIAST BP <80 MM HG: CPT | Mod: CPTII,S$GLB,, | Performed by: NURSE PRACTITIONER

## 2022-11-27 PROCEDURE — 3008F BODY MASS INDEX DOCD: CPT | Mod: CPTII,S$GLB,, | Performed by: NURSE PRACTITIONER

## 2022-11-27 PROCEDURE — 1160F PR REVIEW ALL MEDS BY PRESCRIBER/CLIN PHARMACIST DOCUMENTED: ICD-10-PCS | Mod: CPTII,S$GLB,, | Performed by: NURSE PRACTITIONER

## 2022-11-27 PROCEDURE — 99213 PR OFFICE/OUTPT VISIT, EST, LEVL III, 20-29 MIN: ICD-10-PCS | Mod: S$GLB,,, | Performed by: NURSE PRACTITIONER

## 2022-11-27 PROCEDURE — 1159F MED LIST DOCD IN RCRD: CPT | Mod: CPTII,S$GLB,, | Performed by: NURSE PRACTITIONER

## 2022-11-27 PROCEDURE — 1159F PR MEDICATION LIST DOCUMENTED IN MEDICAL RECORD: ICD-10-PCS | Mod: CPTII,S$GLB,, | Performed by: NURSE PRACTITIONER

## 2022-11-27 PROCEDURE — 87502 POCT INFLUENZA A/B MOLECULAR: ICD-10-PCS | Mod: QW,S$GLB,, | Performed by: NURSE PRACTITIONER

## 2022-11-27 PROCEDURE — 3008F PR BODY MASS INDEX (BMI) DOCUMENTED: ICD-10-PCS | Mod: CPTII,S$GLB,, | Performed by: NURSE PRACTITIONER

## 2022-11-27 PROCEDURE — 87502 INFLUENZA DNA AMP PROBE: CPT | Mod: QW,S$GLB,, | Performed by: NURSE PRACTITIONER

## 2022-11-27 PROCEDURE — 3078F PR MOST RECENT DIASTOLIC BLOOD PRESSURE < 80 MM HG: ICD-10-PCS | Mod: CPTII,S$GLB,, | Performed by: NURSE PRACTITIONER

## 2022-11-27 PROCEDURE — 3074F PR MOST RECENT SYSTOLIC BLOOD PRESSURE < 130 MM HG: ICD-10-PCS | Mod: CPTII,S$GLB,, | Performed by: NURSE PRACTITIONER

## 2022-11-27 PROCEDURE — 99213 OFFICE O/P EST LOW 20 MIN: CPT | Mod: S$GLB,,, | Performed by: NURSE PRACTITIONER

## 2022-11-27 PROCEDURE — 1160F RVW MEDS BY RX/DR IN RCRD: CPT | Mod: CPTII,S$GLB,, | Performed by: NURSE PRACTITIONER

## 2022-11-27 NOTE — PROGRESS NOTES
"Subjective:       Patient ID: Lida Reed is a 22 y.o. female.    Vitals:  height is 5' 4.02" (1.626 m) and weight is 53.5 kg (118 lb). Her oral temperature is 98.4 °F (36.9 °C). Her blood pressure is 121/71 and her pulse is 97. Her respiration is 18 and oxygen saturation is 98%.     Chief Complaint: Fall    22 y.o female presents today c/o a fall that happened 5 days ago, hit her L side of head and has her L eye black.  Patient reports headaches, dizziness and nausea.  Patient also reports possible exposure to flu.    Provider note begins below:    Lida is an otherwise well-appearing adult female in no apparent distress.  Had fall approximately 5 days ago and hit her left side of her head.  Currently with left lateral periorbital bruising and edema.  No current loss of visual acuity or photophobia.  No sharp or jagged edges to dentition.  No abrasion or laceration.  Experienced dizziness and nausea night of incident and that has not completely resolved.  Still with intermittent headaches.  Patient describes her activity after fall as typical and active.  Patient does not currently have a primary care provider and requests referral.    Patient's grandmother recently hospitalized for influenza and would like flu testing due to close direct exposure.    Fall  The accident occurred 3 to 5 days ago. The fall occurred while walking. There was no blood loss. The point of impact was the head. The pain is present in the head. Associated symptoms include headaches and nausea. Pertinent negatives include no abdominal pain, bowel incontinence, fever, hearing loss, hematuria, loss of consciousness, numbness, tingling, visual change or vomiting. She has tried acetaminophen for the symptoms.   Constitution: Negative for fever.   Gastrointestinal:  Positive for nausea. Negative for abdominal pain, vomiting and bowel incontinence.   Genitourinary:  Negative for hematuria.   Neurological:  Positive for headaches. Negative for " loss of consciousness and numbness.     Objective:      Physical Exam   Constitutional: She is oriented to person, place, and time. She appears well-developed. She is cooperative.  Non-toxic appearance. She does not appear ill. No distress.   HENT:   Head: Normocephalic. Head is with contusion.       Ears:   Right Ear: Hearing and external ear normal.   Left Ear: Hearing and external ear normal.   Nose: Nose normal. No mucosal edema, rhinorrhea or nasal deformity. No epistaxis.   Mouth/Throat: Mucous membranes are normal. Normal dentition.   Eyes: Conjunctivae, EOM and lids are normal. Pupils are equal, round, and reactive to light. No visual field deficit is present. No scleral icterus. Right eye exhibits normal extraocular motion and no nystagmus. Left eye exhibits normal extraocular motion and no nystagmus. vision grossly intact gaze aligned appropriately   Neck: Trachea normal and phonation normal. Neck supple. No neck rigidity present.   Cardiovascular: Normal rate, regular rhythm, normal heart sounds and normal pulses.   Pulmonary/Chest: Effort normal and breath sounds normal. No respiratory distress.   Abdominal: Normal appearance.   Musculoskeletal: Normal range of motion.         General: No deformity. Normal range of motion.   Neurological: no focal deficit. She is alert and oriented to person, place, and time. She has normal sensation and normal reflexes. She displays no weakness, no tremor, facial symmetry and no dysarthria. No cranial nerve deficit or sensory deficit. She exhibits normal muscle tone. She shows no pronator drift. Gait normal. Coordination normal.   Reflex Scores:       Patellar reflexes are 2+ on the right side and 2+ on the left side.  Skin: Skin is warm, dry, intact, not diaphoretic and not pale.   Psychiatric: Her speech is normal and behavior is normal. Memory, judgment and thought content normal. Cognition normal  Nursing note and vitals reviewed.      Results for orders placed or  performed in visit on 11/27/22   POCT Influenza A/B MOLECULAR   Result Value Ref Range    POC Molecular Influenza A Ag Negative Negative, Not Reported    POC Molecular Influenza B Ag Negative Negative, Not Reported     Acceptable Yes      Vision Screening    Right eye Left eye Both eyes   Without correction 20/25 20/30 20/25   With correction        XR FACIAL BONES 3 OR MORE VIEW    Result Date: 11/27/2022  EXAMINATION: XR FACIAL BONES 3 OR MORE VIEW CLINICAL HISTORY: Unspecified injury of head, initial encounter TECHNIQUE: Four views of the facial bones were performed. COMPARISON: None FINDINGS: No evidence of facial bone fracture. Electronically signed by: Dennis Oliver Jr Date:    11/27/2022 Time:    11:41       Assessment:       1. Concussion without loss of consciousness, initial encounter    2. Contusion of face, initial encounter    3. Traumatic injury of head, initial encounter    4. Exposure to the flu            Plan:       Labs and Xrays ordered at this visit reviewed.     Concussion without loss of consciousness, initial encounter  -     Ambulatory referral/consult to Neurology    Contusion of face, initial encounter    Traumatic injury of head, initial encounter  -     XR FACIAL BONES 3 OR MORE VIEW; Future; Expected date: 11/27/2022  -     Ambulatory referral/consult to Neurology    Exposure to the flu  -     POCT Influenza A/B MOLECULAR

## 2022-11-29 ENCOUNTER — OFFICE VISIT (OUTPATIENT)
Dept: PSYCHOLOGY | Facility: CLINIC | Age: 22
End: 2022-11-29
Payer: COMMERCIAL

## 2022-11-29 DIAGNOSIS — F50.01 ANOREXIA NERVOSA, RESTRICTING TYPE: Primary | ICD-10-CM

## 2022-11-29 PROCEDURE — 90837 PR PSYCHOTHERAPY W/PATIENT, 60 MIN: ICD-10-PCS | Mod: S$GLB,,, | Performed by: PSYCHOLOGIST

## 2022-11-29 PROCEDURE — 90785 PR INTERACTIVE COMPLEXITY: ICD-10-PCS | Mod: S$GLB,,, | Performed by: PSYCHOLOGIST

## 2022-11-29 PROCEDURE — 90785 PSYTX COMPLEX INTERACTIVE: CPT | Mod: S$GLB,,, | Performed by: PSYCHOLOGIST

## 2022-11-29 PROCEDURE — 90837 PSYTX W PT 60 MINUTES: CPT | Mod: S$GLB,,, | Performed by: PSYCHOLOGIST

## 2022-11-29 NOTE — PROGRESS NOTES
.       Psychology Outpatient Clinic  Outpatient Therapy Progress Note                 Lida Reed     Date of Visit:   11/29/2022  YOB: 2000   Diagnosis:   Anorexia Nervosa, restricting type; ADHD; Unspecified Depressed mood  Time Seen:  9:00-9:57 AM  Billing code(s):  30848, 35882         Progress Note  Dagmar presented on time for her scheduled appointment to address eating disorder, anxiety, and depressed mood.  She reports that she has been doing well keeping up with her recovery plan and other treatment appointments through [facility].  Today, Lida and this psychologist discussed values including meaning of values and defining Lida's current values as a compass to guide future treatment sessions with ACT.  Lida actively participated in exercises and approaches exercises with openness and honesty.  Lida is motivated to participate in psychotherapy.  She denies any thoughts or behaviors of suicidality.  Reports her mood as neither good nor bad.      TREATMENT MODALITIES USED:  ACT, supportive psychotherapy    INTERACTIVE COMPLEXITY EXPLANATION:  This session involved Interactive Complexity (96208); that is, specific communication factors complicated the delivery of the procedure.  Specifically, evaluation participant emotions interfered with understanding and ability to assist with providing information about the patient.      Assessment:  Lida is experiencing anxiety and depressed mood in addition Anorexia Nervosa.  She has made good progress in recovery of AN and currently feels that she is able to work her treatment plan.  However, progress is new and she will benefit from continue psychotherapy to support her recovery and process and manage anxiety and depressed mood.  Currently, she is at minimal risk of suicidal ideation or self-harm and denies thoughts of SI.    Plan:  Lida will continue weekly psychotherapy for anorexia and associated anxiety and depressed mood.      Johanne EASLEY  Steffany, PhD, ABPP  Licensed & Board Certified Clinical Psychologist

## 2022-12-02 ENCOUNTER — TELEPHONE (OUTPATIENT)
Dept: PSYCHIATRY | Facility: CLINIC | Age: 22
End: 2022-12-02
Payer: COMMERCIAL

## 2022-12-05 ENCOUNTER — TELEPHONE (OUTPATIENT)
Dept: PSYCHOLOGY | Facility: CLINIC | Age: 22
End: 2022-12-05
Payer: COMMERCIAL

## 2022-12-05 NOTE — TELEPHONE ENCOUNTER
Spoke to the patient about scheduling follow up visits with . Patient scheduled. Patient verbalized understanding of the appointments

## 2022-12-08 ENCOUNTER — OFFICE VISIT (OUTPATIENT)
Dept: PSYCHOLOGY | Facility: CLINIC | Age: 22
End: 2022-12-08
Payer: COMMERCIAL

## 2022-12-08 DIAGNOSIS — F50.01 ANOREXIA NERVOSA, RESTRICTING TYPE: Primary | ICD-10-CM

## 2022-12-08 PROCEDURE — 90834 PR PSYCHOTHERAPY W/PATIENT, 45 MIN: ICD-10-PCS | Mod: S$GLB,,, | Performed by: PSYCHOLOGIST

## 2022-12-08 PROCEDURE — 90834 PSYTX W PT 45 MINUTES: CPT | Mod: S$GLB,,, | Performed by: PSYCHOLOGIST

## 2022-12-08 PROCEDURE — 90785 PR INTERACTIVE COMPLEXITY: ICD-10-PCS | Mod: S$GLB,,, | Performed by: PSYCHOLOGIST

## 2022-12-08 PROCEDURE — 90785 PSYTX COMPLEX INTERACTIVE: CPT | Mod: S$GLB,,, | Performed by: PSYCHOLOGIST

## 2022-12-08 NOTE — PROGRESS NOTES
.       Psychology Outpatient Clinic  Outpatient Therapy Progress Note                 Lida Reed     Date of Visit:   12/8/2022  YOB: 2000   Diagnosis:   Anorexia Nervosa, restricting type; ADHD; Unspecified Depressed mood  Time Seen:  10:00-9:50 AM  Billing code(s):  93389, 07543         Progress Note  Dagmar presented on time for her scheduled appointment to address eating disorder, anxiety, and depressed mood.  Continued working on values, value-based living and barriers.  Lida participated in these exercises and was engaged in discussion.  Discussed how this impacts current planning and decision making regarding ED recovery and plans to re-enroll in school in the spring.  Lida denies any thoughts or behaviors of suicidality.  Reports her mood as neither good nor bad.      TREATMENT MODALITIES USED:  ACT    INTERACTIVE COMPLEXITY EXPLANATION:  This session involved Interactive Complexity (65992); that is, specific communication factors complicated the delivery of the procedure.  Specifically, evaluation participant emotions interfered with understanding and ability to assist with providing information about the patient.      Assessment:  Lida is experiencing anxiety and depressed mood in addition Anorexia Nervosa.  She is making good progress in recovery of AN and currently feels that she is able to work her treatment plan.  She will benefit from continued evidence-based treatment for depressed mood and enhanced motivated for recovery. Currently, she is at minimal risk of suicidal ideation or self-harm and denies thoughts of SI.    Plan:  Lida will continue weekly psychotherapy for anorexia and associated anxiety and depressed mood.      Johanne Jeter, PhD, ABPP  Licensed & Board Certified Clinical Psychologist

## 2022-12-22 ENCOUNTER — OFFICE VISIT (OUTPATIENT)
Dept: PSYCHOLOGY | Facility: CLINIC | Age: 22
End: 2022-12-22
Payer: COMMERCIAL

## 2022-12-22 DIAGNOSIS — F90.0 ADHD, PREDOMINANTLY INATTENTIVE TYPE: ICD-10-CM

## 2022-12-22 DIAGNOSIS — F50.01 ANOREXIA NERVOSA, RESTRICTING TYPE: Primary | ICD-10-CM

## 2022-12-22 PROCEDURE — 90785 PR INTERACTIVE COMPLEXITY: ICD-10-PCS | Mod: S$GLB,,, | Performed by: PSYCHOLOGIST

## 2022-12-22 PROCEDURE — 90834 PSYTX W PT 45 MINUTES: CPT | Mod: S$GLB,,, | Performed by: PSYCHOLOGIST

## 2022-12-22 PROCEDURE — 90785 PSYTX COMPLEX INTERACTIVE: CPT | Mod: S$GLB,,, | Performed by: PSYCHOLOGIST

## 2022-12-22 PROCEDURE — 90834 PR PSYCHOTHERAPY W/PATIENT, 45 MIN: ICD-10-PCS | Mod: S$GLB,,, | Performed by: PSYCHOLOGIST

## 2022-12-22 NOTE — PROGRESS NOTES
.       Psychology Outpatient Clinic  Outpatient Therapy Progress Note                 Lida Reed     Date of Visit:   12/22/2022  YOB: 2000   Diagnosis:   Anorexia Nervosa, restricting type; ADHD; Unspecified Depressed mood  Time Seen:  9:15-10:00 AM  Billing code(s):  92827, 45263         Progress Note  Dagmar presented on time for her scheduled appointment to address eating disorder, anxiety, and depressed mood.  Continued working on values, value-based living and barriers.  Discussed transitions now that she has graduated and been discharged from [facility].  Centered discussion on maintaining gains of ED recovery while also focusing on other goals including education/career.  Discussed ways to balance both values.  Lida denies SI.    TREATMENT MODALITIES USED:  ACT    INTERACTIVE COMPLEXITY EXPLANATION:  This session involved Interactive Complexity (86170); that is, specific communication factors complicated the delivery of the procedure.  Specifically, evaluation participant emotions interfered with understanding and ability to assist with providing information about the patient.      Assessment:  Lida is experiencing anxiety and depressed mood in addition Anorexia Nervosa.  She is making good progress in recovery of AN and currently feels that she is able to work her treatment plan.  She will benefit from continued evidence-based treatment for depressed mood and enhanced motivated for recovery. Currently, she is at minimal risk of suicidal ideation or self-harm and denies thoughts of SI.    Plan:  Lida will continue weekly psychotherapy for anorexia and associated anxiety and depressed mood.      Johanne Jeter, PhD, ABPP  Licensed & Board Certified Clinical Psychologist

## 2023-01-05 ENCOUNTER — OFFICE VISIT (OUTPATIENT)
Dept: PSYCHOLOGY | Facility: CLINIC | Age: 23
End: 2023-01-05
Payer: COMMERCIAL

## 2023-01-05 DIAGNOSIS — F50.01 ANOREXIA NERVOSA, RESTRICTING TYPE: Primary | ICD-10-CM

## 2023-01-05 PROCEDURE — 90834 PR PSYCHOTHERAPY W/PATIENT, 45 MIN: ICD-10-PCS | Mod: S$GLB,,, | Performed by: PSYCHOLOGIST

## 2023-01-05 PROCEDURE — 90834 PSYTX W PT 45 MINUTES: CPT | Mod: S$GLB,,, | Performed by: PSYCHOLOGIST

## 2023-01-24 ENCOUNTER — TELEPHONE (OUTPATIENT)
Dept: PSYCHOLOGY | Facility: CLINIC | Age: 23
End: 2023-01-24
Payer: COMMERCIAL

## 2023-01-24 NOTE — TELEPHONE ENCOUNTER
Shashank DELGADILLO MA made call to patient to schedule a virtual therapy appointment with Dr. Jeter. No answer. Left message for patient to give us a call back.

## 2023-01-24 NOTE — TELEPHONE ENCOUNTER
Shashank DELGADILLO MA received call from patient to schedule a therapy appointment with Dr. Jeter . Patient verbalized understanding and confirmed virtual therapy appt date of 1/31/2023 at 11AM with Dr. Jeter.

## 2023-01-27 ENCOUNTER — PATIENT MESSAGE (OUTPATIENT)
Dept: PSYCHOLOGY | Facility: CLINIC | Age: 23
End: 2023-01-27
Payer: COMMERCIAL

## 2023-01-31 ENCOUNTER — TELEPHONE (OUTPATIENT)
Dept: PSYCHOLOGY | Facility: CLINIC | Age: 23
End: 2023-01-31
Payer: COMMERCIAL

## 2023-01-31 ENCOUNTER — OFFICE VISIT (OUTPATIENT)
Dept: PSYCHOLOGY | Facility: CLINIC | Age: 23
End: 2023-01-31
Payer: COMMERCIAL

## 2023-01-31 DIAGNOSIS — F50.01 ANOREXIA NERVOSA, RESTRICTING TYPE: Primary | ICD-10-CM

## 2023-01-31 DIAGNOSIS — F90.0 ADHD, PREDOMINANTLY INATTENTIVE TYPE: ICD-10-CM

## 2023-01-31 PROCEDURE — 90834 PSYTX W PT 45 MINUTES: CPT | Mod: 95,,, | Performed by: PSYCHOLOGIST

## 2023-01-31 PROCEDURE — 90834 PR PSYCHOTHERAPY W/PATIENT, 45 MIN: ICD-10-PCS | Mod: 95,,, | Performed by: PSYCHOLOGIST

## 2023-01-31 NOTE — PROGRESS NOTES
.       Psychology Outpatient Clinic  Outpatient Therapy Progress Note                 Lida Reed     Date of Visit:   1/31/2023  YOB: 2000   Diagnosis:   Anorexia Nervosa, restricting type; ADHD; Unspecified Depressed mood  Time Seen:  11:00-11:50 AM  Billing code(s):  44553, [id number]       Telehealth Information  Originating Site:   Patient's home via secure telehealth virtual platform    Distant Site:   [facility]    The patient understands the limitations of telepsychology evaluations and was informed of access to in-person follow-up if desired or needed.  Patient/Family member's identity was confirmed and confidentiality/privacy confirmed prior to visit. I certify that this visit was done via secure two-way simultaneous audio and video transmission with informed consent of the patient and/or guardian.      Progress Note  Psychologist met with Lida to address anorexia nervosa.    Weight was 121 yesterday for blind weight scale to Rabia Arias (RD at [facility] in ).    Dietician in [facility]: Car Romero RD.  Ms. Romero will be doing in-person weight and omonitoring once per week.  Will be meeting on Thursday with her and will sign an DELLA.    Discussed future treatment planning with her transition to attending college in [place].  She reports that she was worries that she was not appropriately accounting for calories burned with increase activity of walking to and from classes.  She feels relieved and more confident now that she is able to manage her own eating and activity goals and work her recovery plan.  She feels confident about her progress in classes and keeping up with work.  Also discussed family stressors and management of those relationships.  Continued working on identifying behaviors as they relate to identified values.  Lida denies SI.    TREATMENT MODALITIES USED:  ACT    INTERACTIVE COMPLEXITY EXPLANATION:  This session involved Interactive Complexity  (65424); that is, specific communication factors complicated the delivery of the procedure.  Specifically, evaluation participant emotions interfered with understanding and ability to assist with providing information about the patient.      Assessment:  Lida is experiencing anxiety and depressed mood in addition Anorexia Nervosa.  She is making good progress in recovery of AN and currently feels that she is able to work her treatment plan.  She will benefit from continued evidence-based treatment for depressed mood and enhanced motivated for recovery. Currently, she is at minimal risk of suicidal ideation or self-harm and denies thoughts of SI.    Plan:  Lida will continue weekly psychotherapy for anorexia and associated anxiety and depressed mood.      Johanne Jeter, PhD, ABPP  Licensed & Board Certified Clinical Psychologist

## 2023-01-31 NOTE — TELEPHONE ENCOUNTER
Shashank DELGADILLO MA made call to patient on behalf of Dr. Jeter to schedule a follow-up virtual therapy appointments. Patient verbalized understanding and confirmed appt dates of 2/6/2023 at 3 PM, 2/14/2023 at 11 AM, and 2/28/2023 at 3 PM with Dr. Jeter.

## 2023-02-01 ENCOUNTER — TELEPHONE (OUTPATIENT)
Dept: PSYCHOLOGY | Facility: CLINIC | Age: 23
End: 2023-02-01
Payer: COMMERCIAL

## 2023-02-01 NOTE — TELEPHONE ENCOUNTER
Shashank DELGADILLO MA made call to patient to schedule a virtual therapy appointment with Dr. Jeter, the week of 2/20/2023 - 2/24/2023. No answer. Left message for patient to give us a call back.

## 2023-02-06 ENCOUNTER — OFFICE VISIT (OUTPATIENT)
Dept: PSYCHOLOGY | Facility: CLINIC | Age: 23
End: 2023-02-06
Payer: COMMERCIAL

## 2023-02-06 DIAGNOSIS — F90.0 ADHD, PREDOMINANTLY INATTENTIVE TYPE: ICD-10-CM

## 2023-02-06 DIAGNOSIS — F50.01 ANOREXIA NERVOSA, RESTRICTING TYPE: Primary | ICD-10-CM

## 2023-02-06 PROCEDURE — 90834 PR PSYCHOTHERAPY W/PATIENT, 45 MIN: ICD-10-PCS | Mod: 95,,, | Performed by: PSYCHOLOGIST

## 2023-02-06 PROCEDURE — 90834 PSYTX W PT 45 MINUTES: CPT | Mod: 95,,, | Performed by: PSYCHOLOGIST

## 2023-02-06 NOTE — PROGRESS NOTES
.       Psychology Outpatient Clinic  Outpatient Therapy Progress Note       Patient Name:         Lida Reed     Date of Visit:   6/15/2023  YOB: 2000   Diagnosis:   Anorexia Nervosa, restricting type; ADHD; Unspecified Depressed mood  Time Seen:  3:00-3:50 PM  Billing code(s):  04918       Telehealth Information  Originating Site:   Patient's home via secure telehealth virtual platform    Distant Site:   [facility]    The patient understands the limitations of telepsychology evaluations and was informed of access to in-person follow-up if desired or needed.  Patient/Family member's identity was confirmed and confidentiality/privacy confirmed prior to visit. I certify that this visit was done via secure two-way simultaneous audio and video transmission with informed consent of the patient and/or guardian.      Progress Note  Psychologist met with Lida to address anorexia nervosa.  Engaged in ACT strategies to orient to goals of recovery and personal growth.  Discussed stressors in context of values.  Lida is motivated to do well with her recovery and to take responsibility for her progress.  She is nervous about managing her recovery more independently but feels ready to take this responsibility.  Psychologist reframed nervousness in terms of her taking her recovery seriously and with intention.  Lida denies SI.    TREATMENT MODALITIES USED:  ACT        Assessment:  Lida is experiencing anxiety and depressed mood in addition Anorexia Nervosa.  She is making good progress in recovery of AN and currently feels that she is able to work her treatment plan.  She will benefit from continued evidence-based treatment for depressed mood and enhanced motivated for recovery. Currently, she is at minimal risk of suicidal ideation or self-harm and denies thoughts of SI.    Plan:  Lida will continue weekly psychotherapy for anorexia and associated anxiety and depressed  mood.      Johanne Jeter, PhD, ABPP  Licensed & Board Certified Clinical Psychologist

## 2023-02-07 ENCOUNTER — TELEPHONE (OUTPATIENT)
Dept: PSYCHOLOGY | Facility: CLINIC | Age: 23
End: 2023-02-07
Payer: COMMERCIAL

## 2023-02-07 NOTE — TELEPHONE ENCOUNTER
Shashank DELGADILLO MA made call to patient to schedule a follow-up therapy appointment with Dr. Jeter, the week of 2/20-2/24. No answer. Left message for patient to give us a call back.

## 2023-02-14 ENCOUNTER — OFFICE VISIT (OUTPATIENT)
Dept: PSYCHOLOGY | Facility: CLINIC | Age: 23
End: 2023-02-14
Payer: COMMERCIAL

## 2023-02-14 DIAGNOSIS — F90.0 ADHD, PREDOMINANTLY INATTENTIVE TYPE: ICD-10-CM

## 2023-02-14 DIAGNOSIS — F50.01 ANOREXIA NERVOSA, RESTRICTING TYPE: Primary | ICD-10-CM

## 2023-02-14 PROCEDURE — 90834 PSYTX W PT 45 MINUTES: CPT | Mod: 95,,, | Performed by: PSYCHOLOGIST

## 2023-02-14 PROCEDURE — 90834 PR PSYCHOTHERAPY W/PATIENT, 45 MIN: ICD-10-PCS | Mod: 95,,, | Performed by: PSYCHOLOGIST

## 2023-02-14 NOTE — PROGRESS NOTES
.       Psychology Outpatient Teleheath Clinic  Psychotherapy Progress Note                 Lida Reed     Date of Visit:   2/14/2023  YOB: 2000   Diagnosis:   Anorexia Nervosa, restricting type; ADHD; Unspecified Depressed mood  Time Seen:  11:00-11:38 AM  Billing code(s):  49484 +95 (telehealth modifier)    Telehealth Information  Originating Site:   Patient's apartment via secure telehealth virtual platform    Distant Site:   [facility]    The patient understands the limitations of telepsychology evaluations and was informed of access to in-person follow-up if desired or needed.  Patient/Family member's identity was confirmed and confidentiality/privacy confirmed prior to visit. I certify that this visit was done via secure two-way simultaneous audio and video transmission with informed consent of the patient and/or guardian.       Diagnoses:  Patient Active Problem List   Diagnosis    ADHD, predominantly inattentive type    Other specific developmental learning difficulties    Pain in right shin    Eating problem    Anorexia nervosa, restricting type         Progress Note  Psychologist met with Lida via telehealth platform to address anorexia nervosa.  She reports that her anxiety has been high due to being busier studying for school, tests.  She is currently staying on top of her assignments  Feels like her anxiety is still helpful--discussed performance/anxiety relationship.  Discussed organizational strategies to help.  Reports that mood has been ok.  Lida is meeting with her new RD in [place] weekly in person.  She likes new dietician.   Lida reports that she is doing well with eating all of her meals.  Feels like it is starting to get easier as she continues to practice it.  She feels that she is being more consistent with her eating and health.  No worries about having to walk around campus anymore and impact of more physical activity on her overall  health.  She is still using reminders for snacks, but not for meals.  Discussed maintenance and ways to recognize when she may need to add more reminders.      TREATMENT MODALITIES USED:  ACT, CBT      Assessment:  Lida is experiencing anxiety and depressed mood in addition Anorexia Nervosa.  She is making good progress in recovery of AN and currently feels that she is able to work her treatment plan.  She will benefit from continued evidence-based treatment for depressed mood and enhanced motivated for recovery. Currently, she is at minimal risk of suicidal ideation or self-harm and denies thoughts of SI.    Plan:  Lida will continue weekly psychotherapy for anorexia and associated anxiety and depressed mood.      Johanne Jeter, PhD, ABPP  Licensed & Board Certified Clinical Psychologist

## 2023-02-22 ENCOUNTER — OFFICE VISIT (OUTPATIENT)
Dept: PSYCHOLOGY | Facility: CLINIC | Age: 23
End: 2023-02-22
Payer: COMMERCIAL

## 2023-02-22 DIAGNOSIS — F50.01 ANOREXIA NERVOSA, RESTRICTING TYPE: Primary | ICD-10-CM

## 2023-02-22 PROCEDURE — 90834 PSYTX W PT 45 MINUTES: CPT | Mod: 95,,, | Performed by: PSYCHOLOGIST

## 2023-02-22 PROCEDURE — 90834 PR PSYCHOTHERAPY W/PATIENT, 45 MIN: ICD-10-PCS | Mod: 95,,, | Performed by: PSYCHOLOGIST

## 2023-02-22 NOTE — PROGRESS NOTES
.       Psychology Outpatient Teleheath Clinic  Psychotherapy Progress Note                 Lida Reed     Date of Visit:   2/22/2023  YOB: 2000   Diagnosis:   Anorexia Nervosa, restricting type; ADHD; Unspecified Depressed mood  Time Seen:  11:00-11:38 AM  Billing code(s):  65433 +95 (telehealth modifier)    Telehealth Information  Originating Site:   Patient's house in [place] via secure telehealth virtual platform    Distant Site:   [facility]    The patient understands the limitations of telepsychology evaluations and was informed of access to in-person follow-up if desired or needed.  Patient/Family member's identity was confirmed and confidentiality/privacy confirmed prior to visit. I certify that this visit was done via secure two-way simultaneous audio and video transmission with informed consent of the patient and/or guardian.       Diagnoses:  Patient Active Problem List   Diagnosis    ADHD, predominantly inattentive type    Other specific developmental learning difficulties    Pain in right shin    Eating problem    Anorexia nervosa, restricting type         Progress Note  Psychologist met with Lida via telehealth platform to address anorexia nervosa.  Discussed stressors related to family dynamics and expectations of behavior. This was related to her eating disorder recovery and expectations for behavior outside of eating as well.  Lida reports her mood has been really good lately, but reports that she is feeling tired today.  She is proud of how she has stayed on top of eating habits and continued with her eating disorder recovery.  She described that she brought extra food to eat outside of meal and snack times--was hungry more with more walking a physical activity.  She is taking her Prozac and Zyprexa and feels that this is helpful.  She denies suicidal ideation and is currently at low risk for self-harm.    TREATMENT MODALITIES USED:  ACT,  CBT      Assessment:  Lida is experiencing anxiety and depressed mood in addition Anorexia Nervosa.  She is making good progress in recovery of AN and currently feels that she is able to work her treatment plan.  She will benefit from continued evidence-based treatment for depressed mood and enhanced motivated for recovery. Currently, she is at minimal risk of suicidal ideation or self-harm and denies thoughts of SI.    Plan:  Lida will continue weekly psychotherapy for anorexia and associated anxiety and depressed mood.      Johanne Jeter, PhD, ABPP  Licensed & Board Certified Clinical Psychologist

## 2023-02-27 NOTE — LETTER
November 27, 2022      [facility] [address] [address]  Phone: [phone]  Fax: [phone]       Patient: Lida Reed   YOB: 2000  Date of Visit: 11/27/2022    To Whom It May Concern:    Eloisa Reed  was at [facility] on 11/27/2022. The patient may return to work/school on 11/30/2022 with no restrictions. If you have any questions or concerns, or if I can be of further assistance, please do not hesitate to contact me.    Sincerely,      Dawit Lynch, NP      Cimzia Counseling:  I discussed with the patient the risks of Cimzia including but not limited to immunosuppression, allergic reactions and infections.  The patient understands that monitoring is required including a PPD at baseline and must alert us or the primary physician if symptoms of infection or other concerning signs are noted.

## 2023-03-02 ENCOUNTER — OFFICE VISIT (OUTPATIENT)
Dept: PSYCHOLOGY | Facility: CLINIC | Age: 23
End: 2023-03-02
Payer: COMMERCIAL

## 2023-03-02 DIAGNOSIS — F50.01 ANOREXIA NERVOSA, RESTRICTING TYPE: Primary | ICD-10-CM

## 2023-03-02 PROCEDURE — 90834 PSYTX W PT 45 MINUTES: CPT | Mod: 95,,, | Performed by: PSYCHOLOGIST

## 2023-03-02 PROCEDURE — 90834 PR PSYCHOTHERAPY W/PATIENT, 45 MIN: ICD-10-PCS | Mod: 95,,, | Performed by: PSYCHOLOGIST

## 2023-03-08 NOTE — PROGRESS NOTES
.       Psychology Outpatient Clinic  Outpatient Therapy Progress Note  Telehealth               Lida Reed     Date of Visit:   11/15/2022  YOB: 2000   Diagnosis:   Anorexia Nervosa, restricting type; ADHD; Unspecified Depressed mood  Time Seen:  9:00-9:52 AM  Billing code(s):  93696, 00371         Progress Note  Dagmar presented on time for her scheduled appointment to address eating disorder, anxiety, and depressed mood.  She reports that she has been doing well keeping up with her appointments in PHP and RD.  Discussed value-based living and working toward her current values.  Framed values in terms of meeting her goals both in terms of ED recovery and other goals that she would like to be working toward in other domains of life.  Lida is motivated to participate in psychotherapy.  She denies any thoughts or behaviors of suicidality.      TREATMENT MODALITIES USED:  ACT, supportive psychotherapy    INTERACTIVE COMPLEXITY EXPLANATION:  This session involved Interactive Complexity (81031); that is, specific communication factors complicated the delivery of the procedure.  Specifically, evaluation participant emotions interfered with understanding and ability to assist with providing information about the patient.      Assessment:  Lida is experiencing anxiety and depressed mood in addition Anorexia Nervosa.  She has made good progress in recovery of AN and currently feels that she is able to work her treatment plan.  However, progress is new and she will benefit from continue psychotherapy to support her recovery and process and manage anxiety and depressed mood.  Currently, she is at minimal risk of suicidal ideation or self-harm and denies thoughts of SI.    Plan:  Lida will continue weekly psychotherapy for anorexia.      Johanne Jeter, PhD, ABPP  Licensed & Board Certified Clinical Psychologist

## 2023-03-09 ENCOUNTER — OFFICE VISIT (OUTPATIENT)
Dept: PSYCHOLOGY | Facility: CLINIC | Age: 23
End: 2023-03-09
Payer: COMMERCIAL

## 2023-03-09 DIAGNOSIS — F50.01 ANOREXIA NERVOSA, RESTRICTING TYPE: Primary | ICD-10-CM

## 2023-03-09 PROCEDURE — 90834 PSYTX W PT 45 MINUTES: CPT | Mod: 95,,, | Performed by: PSYCHOLOGIST

## 2023-03-09 PROCEDURE — 90834 PR PSYCHOTHERAPY W/PATIENT, 45 MIN: ICD-10-PCS | Mod: 95,,, | Performed by: PSYCHOLOGIST

## 2023-03-09 NOTE — PROGRESS NOTES
.       [facility]  Psychotherapy Progress Note                 Lida Reed     Date of Visit:   3/9/2023  YOB: 2000   Diagnosis:   Anorexia Nervosa, restricting type; ADHD; Unspecified Depressed mood  Time Seen:  11:00-11:45 AM  Billing code(s):  75085 +95 (telehealth modifier)    Telehealth Information  Originating Site:   Patient's home via secure telehealth virtual platform    Distant Site:   [facility]    The patient understands the limitations of telepsychology evaluations and was informed of access to in-person follow-up if desired or needed.  Patient/Family member's identity was confirmed and confidentiality/privacy confirmed prior to visit. I certify that this visit was done via secure two-way simultaneous audio and video transmission with informed consent of the patient and/or guardian.       Diagnoses:  Patient Active Problem List   Diagnosis    ADHD, predominantly inattentive type    Other specific developmental learning difficulties    Pain in right shin    Eating problem    Anorexia nervosa, restricting type         Progress Note  Psychologist met with Lida via telehealth platform to address anorexia nervosa.  Discussed how she is managing stress related to both school and her recovery.  Continued use of ACT strategies to increase motivation in moving toward her goals and values of health and career, as well as strong relationships.  Lida was engaged in session.  She reports that she is making good progress in school and with her recovery.  She continues to have regular visits with dietician.  She denies suicidal ideation and is currently at low risk for self-harm.    TREATMENT MODALITIES USED:  ACT, CBT      Assessment:  Lida is experiencing anxiety and depressed mood in addition Anorexia Nervosa.  She is making good progress in recovery of AN and currently feels that she is able to work her treatment plan.  She will  benefit from continued evidence-based treatment for depressed mood and enhanced motivated for recovery. Currently, she is at minimal risk of suicidal ideation or self-harm and denies thoughts of SI.    Plan:  Lida will continue weekly psychotherapy for anorexia and associated anxiety and depressed mood.      Johanne Jeter, PhD, ABPP  Licensed & Board Certified Clinical Psychologist

## 2023-03-23 ENCOUNTER — OFFICE VISIT (OUTPATIENT)
Dept: PSYCHOLOGY | Facility: CLINIC | Age: 23
End: 2023-03-23
Payer: COMMERCIAL

## 2023-03-23 DIAGNOSIS — F50.01 ANOREXIA NERVOSA, RESTRICTING TYPE: Primary | ICD-10-CM

## 2023-03-23 PROCEDURE — 90834 PR PSYCHOTHERAPY W/PATIENT, 45 MIN: ICD-10-PCS | Mod: 95,,, | Performed by: PSYCHOLOGIST

## 2023-03-23 PROCEDURE — 90834 PSYTX W PT 45 MINUTES: CPT | Mod: 95,,, | Performed by: PSYCHOLOGIST

## 2023-03-23 NOTE — PROGRESS NOTES
.       Psychology Outpatient Teleheath Clinic  Psychotherapy Progress Note                 Lida Reed     Date of Visit:   3/23/2023  YOB: 2000   Diagnosis:   Anorexia Nervosa, restricting type; ADHD; Unspecified Depressed mood  Time Seen:  11:00-11:38 AM  Billing code(s):  32719 +95 (telehealth modifier)    Telehealth Information  Originating Site:   Patient's house in [place] via secure telehealth virtual platform    Distant Site:   [facility]    The patient understands the limitations of telepsychology evaluations and was informed of access to in-person follow-up if desired or needed.  Patient/Family member's identity was confirmed and confidentiality/privacy confirmed prior to visit. I certify that this visit was done via secure two-way simultaneous audio and video transmission with informed consent of the patient and/or guardian.       Diagnoses:  Patient Active Problem List   Diagnosis    ADHD, predominantly inattentive type    Other specific developmental learning difficulties    Pain in right shin    Eating problem    Anorexia nervosa, restricting type         Progress Note  Psychologist met with Lida via telehealth platform to address anorexia nervosa.  Lida discussed management of her eating behaviors and mood during her vacation.  She reports that she got altitude sickness on vacation, but was able to recover with sleep and water. She did adjust her eating habits while ill for 1.5 days, and then was able to get back on track with regular eating plan.  Psychologist provided accurate praise for her ability to get herself back to her regular eating plan so quickly.      TREATMENT MODALITIES USED:  ACT, CBT      Assessment:  Lida is experiencing anxiety and depressed mood in addition Anorexia Nervosa.  She is making good progress in recovery of AN and currently feels that she is able to work her treatment plan.  She will benefit from continued  evidence-based treatment for depressed mood and enhanced motivated for recovery. Currently, she is at minimal risk of suicidal ideation or self-harm and denies thoughts of SI.    Plan:  Lida will continue weekly psychotherapy for anorexia and associated anxiety and depressed mood.      Johanne Jeter, PhD, ABPP  Licensed & Board Certified Clinical Psychologist

## 2023-04-13 ENCOUNTER — TELEPHONE (OUTPATIENT)
Dept: PSYCHOLOGY | Facility: CLINIC | Age: 23
End: 2023-04-13
Payer: COMMERCIAL

## 2023-04-13 NOTE — TELEPHONE ENCOUNTER
Shashank DELGADILLO MA made call to patient to schedule a therapy appointment with Dr. Jeter. No answer. Unable to leave message.

## 2023-05-22 ENCOUNTER — TELEPHONE (OUTPATIENT)
Dept: PSYCHOLOGY | Facility: CLINIC | Age: 23
End: 2023-05-22
Payer: COMMERCIAL

## 2023-05-22 NOTE — TELEPHONE ENCOUNTER
Shashank DELGADILLO MA called patient on behalf of Dr. Jeter to schedule a therapy appointment. Patient verbalized understanding and confirmed appt date of 5/30/2023 at 9 am with Dr. Jeter.

## 2023-05-27 NOTE — PROGRESS NOTES
.       Psychology Outpatient Clinic  Outpatient Therapy Progress Note                 Lida Reed     Date of Visit:   1/5/2023  YOB: 2000   Diagnosis:   Anorexia Nervosa, restricting type; ADHD; Unspecified Depressed mood  Time Seen:  9:00-9:50 AM  Billing code(s):  68715         Progress Note  Dagmar presented on time for her scheduled appointment to address eating disorder, anxiety, and depressed mood.  Continued working on values, value-based living and barriers.  Lida is working toward living independently in college and working her plan for recovery on her own.    Discussed ways to balance both values.  Lida denies SI.    TREATMENT MODALITIES USED:  ACT    INTERACTIVE COMPLEXITY EXPLANATION:  This session involved Interactive Complexity (17766); that is, specific communication factors complicated the delivery of the procedure.  Specifically, evaluation participant emotions interfered with understanding and ability to assist with providing information about the patient.      Assessment:  Lida is experiencing anxiety and depressed mood in addition Anorexia Nervosa.  She is making good progress in recovery of AN and currently feels that she is able to work her treatment plan.  She will benefit from continued evidence-based treatment for depressed mood and enhanced motivated for recovery. Currently, she is at minimal risk of suicidal ideation or self-harm and denies thoughts of SI.    Plan:  Lida will continue weekly psychotherapy for anorexia and associated anxiety and depressed mood.      Johanne Jeter, PhD, ABPP  Licensed & Board Certified Clinical Psychologist

## 2023-05-29 ENCOUNTER — PATIENT MESSAGE (OUTPATIENT)
Dept: PSYCHOLOGY | Facility: CLINIC | Age: 23
End: 2023-05-29
Payer: COMMERCIAL

## 2023-05-30 ENCOUNTER — OFFICE VISIT (OUTPATIENT)
Dept: PSYCHOLOGY | Facility: CLINIC | Age: 23
End: 2023-05-30
Payer: COMMERCIAL

## 2023-05-30 DIAGNOSIS — F50.01 ANOREXIA NERVOSA, RESTRICTING TYPE: Primary | ICD-10-CM

## 2023-05-30 PROCEDURE — 90834 PR PSYCHOTHERAPY W/PATIENT, 45 MIN: ICD-10-PCS | Mod: 95,,, | Performed by: PSYCHOLOGIST

## 2023-05-30 PROCEDURE — 90834 PSYTX W PT 45 MINUTES: CPT | Mod: 95,,, | Performed by: PSYCHOLOGIST

## 2023-05-30 NOTE — PROGRESS NOTES
.       [facility]  Psychotherapy Progress Note                 Lida Reed     Date of Visit:   5/30/2023   YOB: 2000   Diagnosis:   Anorexia Nervosa, restricting type; ADHD; Unspecified Depressed mood  Time Seen:  9:07-9:58 AM  Billing code(s):  21778 +95 (telehealth modifier)    Telehealth Information  Originating Site:   Patient's house in [place] via secure telehealth virtual platform    Distant Site:   [facility]    The patient understands the limitations of telepsychology evaluations and was informed of access to in-person follow-up if desired or needed.  Patient/Family member's identity was confirmed and confidentiality/privacy confirmed prior to visit. I certify that this visit was done via secure two-way simultaneous audio and video transmission with informed consent of the patient and/or guardian.       Diagnoses:  Patient Active Problem List   Diagnosis    ADHD, predominantly inattentive type    Other specific developmental learning difficulties    Pain in right shin    Eating problem    Anorexia nervosa, restricting type         Progress Note  Psychologist met with Lida via telehealth platform to address anorexia nervosa.  Has been meeting with Navid (dietician) weekly.  Weight has been stable at bottom of range but staying in range.  Is at home for the summer.  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  Feels like she is doing well with meals (same at [facility]) but is more active than she used to be.  Mood has been neutral to happy, is having fun when with friends or doing activities.  Is no longer taking Prozac or Zyprexa for past 2-3 weeks.  Feeling good--has not had any problems since stopping.  Reports that she feels more like herself again.  Discussed treatment goals and planning.  Lida has been making good progress in working toward her values and mood and eating behaviors are improving.      TREATMENT  MODALITIES USED:  ACT, CBT      Assessment:  Lida's mood and eating disorder behaviors are improving.  Eating still requires conscious effort to maintain her gains.  She will benefit from continued evidence-based treatment for depressed mood and enhanced motivated for recovery. Currently, she is at minimal risk of suicidal ideation or self-harm and denies thoughts of SI.      Plan:  Will plan to follow-up in 2-3 weeks for continued psychotherapy.      Johanne Jeter, PhD, ABPP  Licensed & Board Certified Clinical Psychologist

## 2023-06-01 ENCOUNTER — OCCUPATIONAL HEALTH (OUTPATIENT)
Dept: URGENT CARE | Facility: CLINIC | Age: 23
End: 2023-06-01

## 2023-06-01 DIAGNOSIS — Z02.1 PRE-EMPLOYMENT EXAMINATION: Primary | ICD-10-CM

## 2023-06-01 PROCEDURE — 99499 PHYSICAL, BASIC COMPLEXITY: ICD-10-PCS | Mod: S$GLB,,, | Performed by: NURSE PRACTITIONER

## 2023-06-01 PROCEDURE — 80305 DRUG TEST PRSMV DIR OPT OBS: CPT | Mod: S$GLB,,, | Performed by: NURSE PRACTITIONER

## 2023-06-01 PROCEDURE — 80305 OOH COLLECTION ONLY DRUG SCREEN: ICD-10-PCS | Mod: S$GLB,,, | Performed by: NURSE PRACTITIONER

## 2023-06-01 PROCEDURE — 99499 UNLISTED E&M SERVICE: CPT | Mod: S$GLB,,, | Performed by: NURSE PRACTITIONER

## 2023-06-01 PROCEDURE — 99199 UNLISTED SPECIAL SVC PX/RPRT: CPT | Mod: S$GLB,,, | Performed by: NURSE PRACTITIONER

## 2023-06-01 PROCEDURE — 99199 OCC MED MRO FEE: ICD-10-PCS | Mod: S$GLB,,, | Performed by: NURSE PRACTITIONER

## 2023-07-22 NOTE — PROGRESS NOTES
.       Psychology [facility]  Psychotherapy Progress Note                 Lida Reed     Date of Visit:   3/2/2023  YOB: 2000   Diagnosis:   Anorexia Nervosa, restricting type; ADHD; Unspecified Depressed mood  Time Seen:  11:00-11:45 AM  Billing code(s):  90267 +95 (telehealth modifier)    Telehealth Information  Originating Site:   Patient's house in [place] via secure telehealth virtual platform    Distant Site:   [facility]    The patient understands the limitations of telepsychology evaluations and was informed of access to in-person follow-up if desired or needed.  Patient/Family member's identity was confirmed and confidentiality/privacy confirmed prior to visit. I certify that this visit was done via secure two-way simultaneous audio and video transmission with informed consent of the patient and/or guardian.       Diagnoses:  Patient Active Problem List   Diagnosis    ADHD, predominantly inattentive type    Other specific developmental learning difficulties    Pain in right shin    Eating problem    Anorexia nervosa, restricting type         Progress Note  Psychologist met with Lida via telehealth platform to address anorexia nervosa.  Discussed progress in her eating disorder recovery and strategies she is using to support herself in maintaining her eating patterns.  She feels that she is doing well with eating.  She also reports that she is doing well with school and trying to stay organized to keep up with her work. She reports school as a primary stressor at this time but is working to manage this. In session worked on problem solving and cognitive skills to stay focused on goals that lead her toward value-based living and bring her closer to her goals.  She is focusing on career (school) and health at this time.  She denies suicidal ideation and is currently at low risk for self-harm.    TREATMENT MODALITIES USED:  ACT,  CBT      Assessment:  Lida is experiencing anxiety and depressed mood in addition Anorexia Nervosa.  She is making good progress in recovery of AN and currently feels that she is able to work her treatment plan.  She will benefit from continued evidence-based treatment for depressed mood and enhanced motivated for recovery. Currently, she is at minimal risk of suicidal ideation or self-harm and denies thoughts of SI.    Plan:  Lida will continue weekly psychotherapy for anorexia and associated anxiety and depressed mood.      Johanne Jeter, PhD, ABPP  Licensed & Board Certified Clinical Psychologist

## 2023-08-16 DIAGNOSIS — F41.9 ANXIETY: Primary | ICD-10-CM

## 2023-08-17 ENCOUNTER — PATIENT MESSAGE (OUTPATIENT)
Dept: PSYCHIATRY | Facility: CLINIC | Age: 23
End: 2023-08-17
Payer: COMMERCIAL

## 2023-08-18 ENCOUNTER — PATIENT MESSAGE (OUTPATIENT)
Dept: PSYCHIATRY | Facility: CLINIC | Age: 23
End: 2023-08-18
Payer: COMMERCIAL

## 2023-08-18 ENCOUNTER — TELEPHONE (OUTPATIENT)
Dept: PSYCHIATRY | Facility: CLINIC | Age: 23
End: 2023-08-18
Payer: COMMERCIAL

## 2023-08-18 NOTE — TELEPHONE ENCOUNTER
Lvm & snt msg to please contact me back in order to schedule new therapy with Dr Perez per referral from Dr Medellin

## 2023-09-21 NOTE — TELEPHONE ENCOUNTER
----- Message from Nisa Arreola, PhD sent at 1/30/2020  2:38 PM CST -----  Contact: Mom-- [phone]  Sure thing.  Options would be February 6th at 9:00am; February 10th at 3:00pm; or February 11th at 1:00 or 3:00pm.  Whichever time they choose, please schedule as established patient, gen peds schedule, 60 minute appointment - and please block any other schedule that is open at the time you schedule her.    Thanks,  Nisa    ----- Message -----  From: Rodolfo Stevens MA  Sent: 1/30/2020  12:36 PM CST  To: Nisa Arreola, PhD    Nisa, spoke with mom who is requesting a fu with you. Could you advise me of a time and date to put pt on your schedule?  ----- Message -----  From: Rachel Molina  Sent: 1/30/2020  11:53 AM CST  To: Maxwell Paula Staff    Type:  Needs Medical Advice    Who Called:  Mom    Symptoms (please be specific): appt    Would the patient rather a call back or a response via [facility]? Call     Best Call Back Number: [phone]    Additional Information: Mom called to schedule pt an appt. She is requesting a call back.        Admission

## 2023-10-02 ENCOUNTER — PATIENT MESSAGE (OUTPATIENT)
Dept: PSYCHOLOGY | Facility: CLINIC | Age: 23
End: 2023-10-02
Payer: COMMERCIAL

## 2023-11-20 ENCOUNTER — OFFICE VISIT (OUTPATIENT)
Dept: URGENT CARE | Facility: CLINIC | Age: 23
End: 2023-11-20
Payer: COMMERCIAL

## 2023-11-20 VITALS
HEIGHT: 64 IN | RESPIRATION RATE: 12 BRPM | HEART RATE: 68 BPM | DIASTOLIC BLOOD PRESSURE: 80 MMHG | BODY MASS INDEX: 20.14 KG/M2 | SYSTOLIC BLOOD PRESSURE: 110 MMHG | OXYGEN SATURATION: 98 % | WEIGHT: 118 LBS | TEMPERATURE: 99 F

## 2023-11-20 DIAGNOSIS — H01.004 BLEPHARITIS OF LEFT UPPER EYELID, UNSPECIFIED TYPE: Primary | ICD-10-CM

## 2023-11-20 PROCEDURE — 99213 PR OFFICE/OUTPT VISIT, EST, LEVL III, 20-29 MIN: ICD-10-PCS | Mod: S$GLB,,,

## 2023-11-20 PROCEDURE — 99213 OFFICE O/P EST LOW 20 MIN: CPT | Mod: S$GLB,,,

## 2023-11-20 RX ORDER — ERYTHROMYCIN 5 MG/G
OINTMENT OPHTHALMIC EVERY 6 HOURS
Qty: 3.5 G | Refills: 0 | Status: SHIPPED | OUTPATIENT
Start: 2023-11-20 | End: 2023-11-25

## 2023-11-20 NOTE — PROGRESS NOTES
"Subjective:      Patient ID: Lida Reed is a 23 y.o. female.    Vitals:  height is 5' 4" (1.626 m) and weight is 53.5 kg (118 lb). Her temperature is 98.5 °F (36.9 °C). Her blood pressure is 110/80 and her pulse is 68. Her respiration is 12 and oxygen saturation is 98%.     Chief Complaint: Eye Problem (Left eye)    Lida Reed is a 23 y.o. female who presents for L eye problem which onset yesterday. Associated sxs include blurred vision, discharge, itching, and erythema. No FB sensation. Patient denies any fever, chills, SOB, CP, n/v/d, rash, HA, photophobia. She does not wear contacts. Patient denies any exposure to dust, dirt, sand, or metal objects. No pets. Prior Tx includes warm compresses. Mild URI sxs. No known exposures.    Eye Problem   The left eye is affected. This is a new problem. The current episode started yesterday. The problem occurs constantly. The problem has been gradually worsening. There was no injury mechanism. The pain is at a severity of 0/10. There is No known exposure to pink eye. She Does not wear contacts. Associated symptoms include blurred vision, an eye discharge, eye redness and itching. Pertinent negatives include no double vision, fever, foreign body sensation, nausea, photophobia, recent URI or vomiting. She has tried nothing for the symptoms.       Constitution: Negative for chills and fever.   Cardiovascular:  Negative for chest pain.   Eyes:  Positive for eye discharge, eye itching, eye pain, eye redness and blurred vision. Negative for eye trauma, foreign body in eye, photophobia, vision loss, double vision and eyelid swelling.   Respiratory:  Negative for shortness of breath.    Gastrointestinal:  Negative for nausea and vomiting.   Neurological:  Negative for headaches.      Objective:     Physical Exam   Constitutional: She is oriented to person, place, and time. She appears well-developed.   HENT:   Head: Normocephalic and atraumatic.   Ears:   Right Ear: " External ear normal.   Left Ear: External ear normal.   Nose: Nose normal.   Mouth/Throat: Oropharynx is clear and moist.   Eyes: Conjunctivae, EOM and lids are normal. Pupils are equal, round, and reactive to light. Right eye exhibits no discharge and no hordeolum. Left eye exhibits no discharge and no hordeolum. Right conjunctiva is not injected. Left conjunctiva is not injected. Right eye exhibits no nystagmus. Left eye exhibits no nystagmus. Extraocular movement intact vision grossly intact gaze aligned appropriately      Comments: L upper eyelid swelling and erythema. No masses or induration. Mild crusting. No conjunctival erythema. PERRLA. EOM intact. No FB appreciated.   Neck: Trachea normal and phonation normal. Neck supple.   Musculoskeletal: Normal range of motion.         General: Normal range of motion.   Neurological: She is alert and oriented to person, place, and time.   Skin: Skin is warm, dry and intact.   Psychiatric: Her speech is normal and behavior is normal. Judgment and thought content normal.   Nursing note and vitals reviewed.      Assessment:     1. Blepharitis of left upper eyelid, unspecified type        Vision Screening    Right eye Left eye Both eyes   Without correction 20/20 20/25 20/20   With correction          Plan:       Blepharitis of left upper eyelid, unspecified type  -     erythromycin (ROMYCIN) ophthalmic ointment; Place into the left eye every 6 (six) hours. for 5 days  Dispense: 3.5 g; Refill: 0      Afebrile. VSS.  Meds: erythromycin sent to preferred pharmacy.  Recommend warm compresses to affected eye.  Avoid face makeup.  Wash hands frequently and avoid touching the face to prevent the spread of the disease.  Wash pillowcases and towels.  RTC for any worsening symptoms.

## 2023-11-20 NOTE — PATIENT INSTRUCTIONS
Use antibiotic drops as prescribed in the affected eye for 5 days. If it spreads to the opposite eye, start a 5 day treatment in that eye as well.  Wash hands frequently and avoid touching the face to prevent the spread of the disease.  Wash pillowcases and face towels.  Avoid use of face makeup.      You may use these drops for allergic pink eye if symptoms do not improve and eyes become itchy.

## 2023-12-01 ENCOUNTER — OFFICE VISIT (OUTPATIENT)
Dept: URGENT CARE | Facility: CLINIC | Age: 23
End: 2023-12-01
Payer: COMMERCIAL

## 2023-12-01 VITALS
HEART RATE: 78 BPM | BODY MASS INDEX: 20.14 KG/M2 | RESPIRATION RATE: 18 BRPM | DIASTOLIC BLOOD PRESSURE: 65 MMHG | OXYGEN SATURATION: 98 % | WEIGHT: 117.94 LBS | TEMPERATURE: 99 F | HEIGHT: 64 IN | SYSTOLIC BLOOD PRESSURE: 98 MMHG

## 2023-12-01 DIAGNOSIS — R52 BODY ACHES: ICD-10-CM

## 2023-12-01 DIAGNOSIS — J02.9 SORE THROAT: ICD-10-CM

## 2023-12-01 DIAGNOSIS — J06.9 VIRAL URI WITH COUGH: Primary | ICD-10-CM

## 2023-12-01 DIAGNOSIS — R23.3 PALATAL PETECHIAE: ICD-10-CM

## 2023-12-01 LAB
CTP QC/QA: YES
MOLECULAR STREP A: NEGATIVE
POC MOLECULAR INFLUENZA A AGN: NEGATIVE
POC MOLECULAR INFLUENZA B AGN: NEGATIVE
SARS-COV-2 AG RESP QL IA.RAPID: NEGATIVE

## 2023-12-01 PROCEDURE — 99214 OFFICE O/P EST MOD 30 MIN: CPT | Mod: S$GLB,,, | Performed by: NURSE PRACTITIONER

## 2023-12-01 PROCEDURE — 87811 SARS-COV-2 COVID19 W/OPTIC: CPT | Mod: QW,S$GLB,, | Performed by: NURSE PRACTITIONER

## 2023-12-01 PROCEDURE — 87811 SARS CORONAVIRUS 2 ANTIGEN POCT, MANUAL READ: ICD-10-PCS | Mod: QW,S$GLB,, | Performed by: NURSE PRACTITIONER

## 2023-12-01 PROCEDURE — 87502 POCT INFLUENZA A/B MOLECULAR: ICD-10-PCS | Mod: QW,S$GLB,, | Performed by: NURSE PRACTITIONER

## 2023-12-01 PROCEDURE — 99214 PR OFFICE/OUTPT VISIT, EST, LEVL IV, 30-39 MIN: ICD-10-PCS | Mod: S$GLB,,, | Performed by: NURSE PRACTITIONER

## 2023-12-01 PROCEDURE — 87502 INFLUENZA DNA AMP PROBE: CPT | Mod: QW,S$GLB,, | Performed by: NURSE PRACTITIONER

## 2023-12-01 PROCEDURE — 87651 POCT STREP A MOLECULAR: ICD-10-PCS | Mod: QW,S$GLB,, | Performed by: NURSE PRACTITIONER

## 2023-12-01 PROCEDURE — 87651 STREP A DNA AMP PROBE: CPT | Mod: QW,S$GLB,, | Performed by: NURSE PRACTITIONER

## 2023-12-01 RX ORDER — PROMETHAZINE HYDROCHLORIDE AND DEXTROMETHORPHAN HYDROBROMIDE 6.25; 15 MG/5ML; MG/5ML
5 SYRUP ORAL EVERY 6 HOURS PRN
Qty: 118 ML | Refills: 0 | Status: SHIPPED | OUTPATIENT
Start: 2023-12-01 | End: 2023-12-08

## 2023-12-01 RX ORDER — BENZONATATE 100 MG/1
200 CAPSULE ORAL 3 TIMES DAILY PRN
Qty: 30 CAPSULE | Refills: 0 | Status: SHIPPED | OUTPATIENT
Start: 2023-12-01 | End: 2023-12-11

## 2023-12-02 NOTE — PATIENT INSTRUCTIONS
Rest  Hydration/increase fluids  Warm salt water gargles  Chloraseptic spray OTC as directed  Continue OTC medications as directed  Tessalon Perles 3 times daily as needed for cough  Phenergan DM every 6 hours as needed for cough  Typical course and duration of viral illness discussed  Signs and symptoms of worsening discussed  Follow up as needed/with worsening

## 2023-12-02 NOTE — PROGRESS NOTES
"Subjective:      Patient ID: Lida Reed is a 23 y.o. female.    Vitals:  height is 5' 4" (1.626 m) and weight is 53.5 kg (117 lb 15.1 oz). Her oral temperature is 98.6 °F (37 °C). Her blood pressure is 98/65 and her pulse is 78. Her respiration is 18 and oxygen saturation is 98%.     Chief Complaint: Nasal Congestion (Patient rendered today with nasal congestion and body aches.)    23 year old female presents for evaluation of sore throat, cough and body aches x 5 days. Symptom onset Monday. Reports worsening since onset secondary to cough and fatigue.  OTC Mucinex and Advil without relief. Positive sick contacts.     Sore Throat   This is a new problem. The current episode started in the past 7 days. The problem has been gradually worsening. There has been no fever. The pain is at a severity of 4/10. The pain is mild. Associated symptoms include congestion, coughing, ear pain, headaches and trouble swallowing. Pertinent negatives include no diarrhea, shortness of breath or vomiting. She has had no exposure to strep or mono. Treatments tried: Patient took mucinex and advil. The treatment provided no relief.       Constitution: Positive for chills and fatigue (slight). Negative for appetite change, sweating and fever.   HENT:  Positive for ear pain, congestion, sore throat and trouble swallowing. Negative for postnasal drip, sinus pain and sinus pressure.    Neck: neck negative.   Cardiovascular: Negative.    Eyes: Negative.    Respiratory:  Positive for cough and sputum production. Negative for shortness of breath and wheezing.    Gastrointestinal: Negative.  Negative for nausea, vomiting and diarrhea.   Endocrine: negative.   Genitourinary: Negative.    Musculoskeletal: Negative.  Negative for muscle ache.   Allergic/Immunologic: Positive for sneezing.   Neurological:  Positive for headaches.   Hematologic/Lymphatic: Negative.    Psychiatric/Behavioral: Negative.        Objective:     Physical Exam "   Constitutional: She is oriented to person, place, and time. She appears well-developed. She is cooperative.  Non-toxic appearance. She does not appear ill. No distress. normalawake  HENT:   Head: Normocephalic and atraumatic.   Ears:   Right Ear: Hearing, tympanic membrane, external ear and ear canal normal. No cerumen not present. Tympanic membrane is not injected, not scarred, not perforated, not erythematous, not retracted and not bulging. impacted cerumen  Left Ear: Hearing, tympanic membrane, external ear and ear canal normal. No cerumen not present. Tympanic membrane is not injected, not scarred, not perforated, not erythematous, not retracted and not bulging. impacted cerumen  Nose: Mucosal edema and congestion present. No rhinorrhea or nasal deformity. No epistaxis. Right sinus exhibits no maxillary sinus tenderness and no frontal sinus tenderness. Left sinus exhibits no maxillary sinus tenderness and no frontal sinus tenderness.   Mouth/Throat: Uvula is midline and mucous membranes are normal. Mucous membranes are moist. No trismus in the jaw. Normal dentition. No uvula swelling. Posterior oropharyngeal erythema present. No oropharyngeal exudate, posterior oropharyngeal edema or tonsillar abscesses. Tonsils are 0 on the right. Tonsils are 0 on the left. No tonsillar exudate.   Palatal petechiae        Comments: Palatal petechiae    Eyes: Conjunctivae and lids are normal. Pupils are equal, round, and reactive to light. Right eye exhibits no discharge. Left eye exhibits no discharge. No scleral icterus. Extraocular movement intact   Neck: Trachea normal and phonation normal. Neck supple. No edema present. No erythema present. No neck rigidity present.   Cardiovascular: Normal rate, regular rhythm, normal heart sounds and normal pulses.   Pulmonary/Chest: Effort normal. No stridor. No respiratory distress. She has no decreased breath sounds. She has no wheezes. She has no rhonchi. She has no rales. She  exhibits no tenderness.   Abdominal: Normal appearance.   Musculoskeletal: Normal range of motion.         General: No deformity. Normal range of motion.   Neurological: no focal deficit. She is alert and oriented to person, place, and time. She exhibits normal muscle tone. Coordination normal.   Skin: Skin is warm, dry, intact, not diaphoretic and not pale.   Psychiatric: Her speech is normal and behavior is normal. Judgment and thought content normal.   Nursing note and vitals reviewed.    Results for orders placed or performed in visit on 12/01/23   POCT Influenza A/B MOLECULAR   Result Value Ref Range    POC Molecular Influenza A Ag Negative Negative, Not Reported    POC Molecular Influenza B Ag Negative Negative, Not Reported     Acceptable Yes    SARS Coronavirus 2 Antigen, POCT Manual Read   Result Value Ref Range    SARS Coronavirus 2 Antigen Negative Negative     Acceptable Yes        Assessment:     1. Viral URI with cough    2. Sore throat    3. Body aches    4. Palatal petechiae        Plan:     Patient presents with symptoms that are consistent with acute viral illness. Decision to perform Strep/Covid/Flu swabs to evaluate for infection, (-) results discussed. Exam negative for otitis media/sinusitis/bronchitis/pneumonia. Plan is to manage symptoms and prevent worsening, discussed with patient who verbalizes understanding.       Viral URI with cough  -     benzonatate (TESSALON) 100 MG capsule; Take 2 capsules (200 mg total) by mouth 3 (three) times daily as needed for Cough.  Dispense: 30 capsule; Refill: 0  -     promethazine-dextromethorphan (PROMETHAZINE-DM) 6.25-15 mg/5 mL Syrp; Take 5 mLs by mouth every 6 (six) hours as needed (cough).  Dispense: 118 mL; Refill: 0    Sore throat  -     POCT Influenza A/B MOLECULAR  -     POCT Strep A, Molecular    Body aches  -     SARS Coronavirus 2 Antigen, POCT Manual Read    Palatal petechiae  -     POCT Strep A,  Molecular             Patient Instructions   Rest  Hydration/increase fluids  Warm salt water gargles  Chloraseptic spray OTC as directed  Continue OTC medications as directed  Tessalon Perles 3 times daily as needed for cough  Phenergan DM every 6 hours as needed for cough  Typical course and duration of viral illness discussed  Signs and symptoms of worsening discussed  Follow up as needed/with worsening

## 2024-01-31 ENCOUNTER — OFFICE VISIT (OUTPATIENT)
Dept: URGENT CARE | Facility: CLINIC | Age: 24
End: 2024-01-31
Payer: COMMERCIAL

## 2024-01-31 VITALS
TEMPERATURE: 98 F | OXYGEN SATURATION: 97 % | HEART RATE: 80 BPM | BODY MASS INDEX: 19.63 KG/M2 | WEIGHT: 115 LBS | DIASTOLIC BLOOD PRESSURE: 71 MMHG | SYSTOLIC BLOOD PRESSURE: 106 MMHG | HEIGHT: 64 IN | RESPIRATION RATE: 18 BRPM

## 2024-01-31 DIAGNOSIS — J10.1 INFLUENZA A: Primary | ICD-10-CM

## 2024-01-31 DIAGNOSIS — R09.81 NASAL CONGESTION: ICD-10-CM

## 2024-01-31 LAB
CTP QC/QA: YES
POC MOLECULAR INFLUENZA A AGN: POSITIVE
POC MOLECULAR INFLUENZA B AGN: NEGATIVE

## 2024-01-31 PROCEDURE — 87502 INFLUENZA DNA AMP PROBE: CPT | Mod: QW,S$GLB,,

## 2024-01-31 PROCEDURE — 99213 OFFICE O/P EST LOW 20 MIN: CPT | Mod: S$GLB,,,

## 2024-01-31 RX ORDER — OSELTAMIVIR PHOSPHATE 75 MG/1
75 CAPSULE ORAL 2 TIMES DAILY
Qty: 10 CAPSULE | Refills: 0 | Status: SHIPPED | OUTPATIENT
Start: 2024-01-31 | End: 2024-02-05

## 2024-01-31 NOTE — LETTER
January 31, 2024      [facility] [address] [place] [address]  Phone: [phone]  Fax: [phone]       Patient: Lida Reed   YOB: 2000  Date of Visit: 01/31/2024    To Whom It May Concern:    Eloisa Reed  was at [facility] on 01/31/2024. The patient may return to work/school on 02/05/24 with no restrictions OR sooner IF fever free for 24 hours (fever is 100.4F or greater)  . If you have any questions or concerns, or if I can be of further assistance, please do not hesitate to contact me.    Sincerely,          Anna Mott PA-C

## 2024-01-31 NOTE — PROGRESS NOTES
"Subjective:      Patient ID: Lida Reed is a 23 y.o. female.    Vitals:  height is 5' 4" (1.626 m) and weight is 52.2 kg (115 lb). Her oral temperature is 98.3 °F (36.8 °C). Her blood pressure is 106/71 and her pulse is 80. Her respiration is 18 and oxygen saturation is 97%.     Chief Complaint: Nasal Congestion    Mom & Dad both have the flu, patient started with symptoms last night. Headache, nasal congestion, body aches, nausea, & diarrhea. Patient took mucinex this am. Denies fever, CP, SOB, hemoptysis, abdominal pain, dysuria, hematuria, dizziness, weakness. No other acute complaints.       Headache   This is a new problem. The current episode started yesterday. The problem occurs constantly. The problem has been gradually worsening. The pain is located in the Frontal, bilateral and vertex region. The pain does not radiate. The quality of the pain is described as pulsating and throbbing. The pain is at a severity of 6/10. The pain is moderate. Associated symptoms include anorexia, coughing, nausea, sinus pressure and a sore throat. Pertinent negatives include no abdominal pain, dizziness, ear pain, eye pain, fever, neck pain or vomiting. The symptoms are aggravated by sneezing.       Constitution: Negative for chills, fatigue, fever and unexpected weight change.   HENT:  Positive for sinus pressure and sore throat. Negative for ear pain, ear discharge, congestion, sinus pain and trouble swallowing.    Neck: Negative for neck pain and neck stiffness.   Cardiovascular:  Negative for chest pain.   Eyes:  Negative for eye pain.   Respiratory:  Positive for cough. Negative for sputum production, shortness of breath and wheezing.    Gastrointestinal:  Positive for nausea. Negative for abdominal pain and vomiting.   Musculoskeletal:  Negative for muscle ache.   Neurological:  Positive for headaches. Negative for dizziness.      Objective:     Physical Exam   Constitutional: She is oriented to person, place, " and time. She appears well-developed. She is cooperative.  Non-toxic appearance. She does not appear ill. No distress.   HENT:   Head: Normocephalic and atraumatic.   Ears:   Right Ear: Hearing, tympanic membrane, external ear and ear canal normal.   Left Ear: Hearing, tympanic membrane, external ear and ear canal normal.   Nose: Rhinorrhea present. No mucosal edema or nasal deformity. No epistaxis. Right sinus exhibits no maxillary sinus tenderness and no frontal sinus tenderness. Left sinus exhibits no maxillary sinus tenderness and no frontal sinus tenderness.   Mouth/Throat: Uvula is midline, oropharynx is clear and moist and mucous membranes are normal. No trismus in the jaw. Normal dentition. No uvula swelling. No oropharyngeal exudate, posterior oropharyngeal edema or posterior oropharyngeal erythema.   Eyes: Conjunctivae and lids are normal. No scleral icterus.   Neck: Trachea normal and phonation normal. Neck supple. No edema present. No erythema present. No neck rigidity present.   Cardiovascular: Normal rate, regular rhythm, normal heart sounds and normal pulses.   Pulmonary/Chest: Effort normal and breath sounds normal. No respiratory distress. She has no decreased breath sounds. She has no rhonchi.   Abdominal: Normal appearance.   Musculoskeletal: Normal range of motion.         General: No deformity. Normal range of motion.   Neurological: She is alert and oriented to person, place, and time. She exhibits normal muscle tone. Coordination normal.   Skin: Skin is warm, dry, intact, not diaphoretic and not pale.   Psychiatric: Her speech is normal and behavior is normal. Judgment and thought content normal.   Nursing note and vitals reviewed.    Results for orders placed or performed in visit on 01/31/24   POCT Influenza A/B MOLECULAR   Result Value Ref Range    POC Molecular Influenza A Ag Positive (A) Negative, Not Reported    POC Molecular Influenza B Ag Negative Negative, Not Reported    Quality  Control Acceptable Yes          Assessment:     1. Influenza A    2. Nasal congestion        Plan:     Influenza A  -     oseltamivir (TAMIFLU) 75 MG capsule; Take 1 capsule (75 mg total) by mouth 2 (two) times daily. for 5 days  Dispense: 10 capsule; Refill: 0    Nasal congestion  -     POCT Influenza A/B MOLECULAR      Medical Decision Making:   Initial Assessment:   Patient here for evaluation after flu exposure. Symptoms began last night. She is overall well appearing and in NAD. VSS. Afebrile.   Differential Diagnosis:   Covid, flu, strep, pneumonia, bronchitis, etc.  Urgent Care Management:  Flu A positive. No indication for further urgent workup at this time. Will discharge home with rx for Xofluza as patient has previously tolerated this medication well. Discussed additional supportive measures. She will need to f/u with PCP. Provided RTC and ER precautions. Patient verbalized understanding to the above plan of care and had no further question. Exited exam room in stable condition.

## 2024-06-14 ENCOUNTER — PATIENT MESSAGE (OUTPATIENT)
Dept: PSYCHIATRY | Facility: CLINIC | Age: 24
End: 2024-06-14
Payer: COMMERCIAL

## 2024-06-14 RX ORDER — OLANZAPINE 5 MG/1
5 TABLET ORAL NIGHTLY
Qty: 30 TABLET | Refills: 1 | OUTPATIENT
Start: 2024-06-14 | End: 2025-06-14

## 2024-07-19 ENCOUNTER — OFFICE VISIT (OUTPATIENT)
Dept: URGENT CARE | Facility: CLINIC | Age: 24
End: 2024-07-19
Payer: COMMERCIAL

## 2024-07-19 VITALS
DIASTOLIC BLOOD PRESSURE: 80 MMHG | WEIGHT: 109.38 LBS | SYSTOLIC BLOOD PRESSURE: 108 MMHG | RESPIRATION RATE: 16 BRPM | OXYGEN SATURATION: 98 % | HEART RATE: 78 BPM | BODY MASS INDEX: 18.67 KG/M2 | TEMPERATURE: 99 F | HEIGHT: 64 IN

## 2024-07-19 DIAGNOSIS — R50.9 FEVER, UNSPECIFIED FEVER CAUSE: ICD-10-CM

## 2024-07-19 DIAGNOSIS — J06.9 VIRAL URI: Primary | ICD-10-CM

## 2024-07-19 DIAGNOSIS — R09.81 SINUS CONGESTION: ICD-10-CM

## 2024-07-19 LAB
CTP QC/QA: YES
CTP QC/QA: YES
POC MOLECULAR INFLUENZA A AGN: NEGATIVE
POC MOLECULAR INFLUENZA B AGN: NEGATIVE
SARS-COV-2 AG RESP QL IA.RAPID: NEGATIVE

## 2024-07-19 PROCEDURE — 87811 SARS-COV-2 COVID19 W/OPTIC: CPT | Mod: QW,S$GLB,,

## 2024-07-19 PROCEDURE — 87502 INFLUENZA DNA AMP PROBE: CPT | Mod: QW,S$GLB,,

## 2024-07-19 PROCEDURE — 99213 OFFICE O/P EST LOW 20 MIN: CPT | Mod: S$GLB,,,

## 2024-07-19 NOTE — PATIENT INSTRUCTIONS
PLEASE READ YOUR DISCHARGE INSTRUCTIONS ENTIRELY AS IT CONTAINS IMPORTANT INFORMATION.    Please drink plenty of fluids.    Please get plenty of rest.    Please return here or go to the Emergency Department for any concerns or worsening of condition.    Please take an over the counter antihistamine medication (Allegra/Claritin/Zyrtec) of your choice as directed for allergy symptoms and/or runny nose and postnasal drip.    Try an over the counter decongestant for sinus pressure/ear pressure, congestion symptoms like Mucinex D or Sudafed or Phenylephrine. You buy this behind the pharmacy counter.    If you do have Hypertension or palpitations, it is safe to take Coricidin HBP for relief of sinus symptoms.    Tylenol or ibuprofen can also be used as directed for pain and fever unless you have an allergy to them or medical condition such as stomach ulcers, kidney or liver disease or blood thinners etc for which you should not be taking these type of medications.     Sore throat recommendations: Warm fluids, warm salt water gargles, throat lozenges, tea, honey, soup, rest, hydration.    Use over the counter Flonase or Nasocort: one spray each nostril twice daily OR two sprays each nostril once daily until nares dry out, unless you have Glaucoma.   Can also supplement with nasal saline rinse.    Sinus rinses DO NOT USE TAP WATER, if you must, water must be at a rolling boil for 1 minute, let it cool, then use.  May use distilled water, or over the counter nasal saline rinses.  Jean-Paul's vapor rub in shower to help open nasal passages.  May use nasal gel to keep passages moisturized.  May use nasal saline sprays during the day for added relief of congestion.   For those who go to the gym, please do not use the sauna or steam room now to clear sinuses.    Cough     Rest and fluids are important  Can use honey with vijay to soothe your throat    Robitussin or Delsyum for cough suppressant for dry cough.    Mucinex DM or  products containing Guaifenesin or Dextromethorphan for expectorant (wet cough).    Take prescription cough meds (pills) as prescribed; take prescription cough syrup at night as needed for cough.  Do not take both the prescribed cough pills and syrup at the same time or within 6 hours of each other.  Do not take the cough syrup with any other sedative medication as it can can cause drowsiness. Do not operate any heavy machinery, drink or drive while taking the cough syrup.     Please follow up with your primary care doctor or specialist in the next 48-72hrs as needed and if no improvement    If you smoke, please stop smoking.    Please return or see your primary care doctor if you develop new or worsening symptoms.     Lastly, good hand washing and cough hygiene (cough into your elbow) will help prevent the spread of the illness. A general rule is that you are no longer contagious once you have been without a fever for over 24 hours without requiring fever reducing medications.     Please arrange follow up with your primary medical clinic as soon as possible. You must understand that you've received an Urgent Care treatment only and that you may be released before all of your medical problems are known or treated. You, the patient, will arrange for follow up as instructed. If your symptoms worsen or fail to improve you should go to the Emergency Room.

## 2024-07-19 NOTE — LETTER
July 19, 2024      [facility] [address] [place] [address]  Phone: [phone]  Fax: [phone]       Patient: Lida Reed   YOB: 2000  Date of Visit: 07/19/2024    To Whom It May Concern:    Eloisa Reed  was at [facility] on 07/19/2024. The patient may return to work/school on 07/22/2024 with no restrictions. If you have any questions or concerns, or if I can be of further assistance, please do not hesitate to contact me.    Sincerely,    Apple Lora PA-C

## 2024-07-19 NOTE — PROGRESS NOTES
"Subjective:      Patient ID: Lida Reed is a 24 y.o. female.    Vitals:  height is 5' 4" (1.626 m) and weight is 49.6 kg (109 lb 5.6 oz). Her temperature is 98.8 °F (37.1 °C). Her blood pressure is 108/80 and her pulse is 78. Her respiration is 16 and oxygen saturation is 98%.     Chief Complaint: Sinus Problem    Lida Reed is a 24 y.o. female who presents for URI sxs which onset 2 days ago. Associated sxs include chills, congestion, cough, HA, and generalized myalgias. Patient denies any fever, SOB, CP, abd pain, n/v/d, rash, dizziness, or numbness/tingling. Prior Tx includes Tylenol. (+) flu exposure.     Other  This is a new problem. The current episode started in the past 7 days (onset two days ago). The problem occurs constantly. The problem has been gradually improving. Associated symptoms include a change in bowel habit (diarrhea), chills, congestion (nasal congestion), coughing (minimal cough), headaches and myalgias (minimal). Pertinent negatives include no abdominal pain, chest pain, diaphoresis, fatigue, fever (101. two days ago), nausea, numbness, sore throat, swollen glands or vomiting. Nothing aggravates the symptoms. She has tried acetaminophen for the symptoms. The treatment provided mild relief.       Constitution: Positive for chills. Negative for appetite change, sweating, fatigue and fever (101. two days ago).   HENT:  Positive for congestion (nasal congestion). Negative for ear pain, ear discharge, hearing loss, postnasal drip, sinus pain, sinus pressure, sore throat and trouble swallowing.    Cardiovascular:  Negative for chest pain.   Respiratory:  Positive for cough (minimal cough). Negative for sputum production and shortness of breath.    Gastrointestinal:  Negative for abdominal pain, nausea, vomiting and diarrhea.   Musculoskeletal:  Positive for muscle ache (minimal).   Neurological:  Positive for headaches. Negative for dizziness, numbness and tingling.    "   Objective:     Physical Exam   Constitutional: She is oriented to person, place, and time. She appears well-developed. She is cooperative.  Non-toxic appearance. She does not appear ill. No distress.   HENT:   Head: Normocephalic and atraumatic.   Ears:   Right Ear: Hearing, tympanic membrane, external ear and ear canal normal.   Left Ear: Hearing, tympanic membrane, external ear and ear canal normal.   Nose: Nose normal. No mucosal edema or nasal deformity. No epistaxis. Right sinus exhibits no maxillary sinus tenderness and no frontal sinus tenderness. Left sinus exhibits no maxillary sinus tenderness and no frontal sinus tenderness.   Mouth/Throat: Uvula is midline, oropharynx is clear and moist and mucous membranes are normal. Mucous membranes are moist. No trismus in the jaw. Normal dentition. No uvula swelling. No oropharyngeal exudate, posterior oropharyngeal edema or posterior oropharyngeal erythema.   Eyes: Conjunctivae and lids are normal. No scleral icterus. Extraocular movement intact   Neck: Trachea normal and phonation normal. Neck supple. No edema present. No erythema present. No neck rigidity present.   Cardiovascular: Normal rate, regular rhythm, normal heart sounds and normal pulses.   Pulmonary/Chest: Effort normal and breath sounds normal. No stridor. No respiratory distress. She has no decreased breath sounds. She has no wheezes. She has no rhonchi. She has no rales.   Abdominal: Normal appearance.   Musculoskeletal: Normal range of motion.         General: No deformity. Normal range of motion.   Neurological: She is alert and oriented to person, place, and time. She exhibits normal muscle tone. Coordination normal.   Skin: Skin is warm, dry, intact, not diaphoretic and not pale.   Psychiatric: Her speech is normal and behavior is normal. Judgment and thought content normal.   Nursing note and vitals reviewed.      Assessment:     1. Viral URI    2. Sinus congestion    3. Fever, unspecified  fever cause        Results for orders placed or performed in visit on 07/19/24   SARS Coronavirus 2 Antigen, POCT Manual Read   Result Value Ref Range    SARS Coronavirus 2 Antigen Negative Negative     Acceptable Yes    POCT Influenza A/B MOLECULAR   Result Value Ref Range    POC Molecular Influenza A Ag Negative Negative    POC Molecular Influenza B Ag Negative Negative     Acceptable Yes        Plan:       Viral URI    Sinus congestion  -     SARS Coronavirus 2 Antigen, POCT Manual Read    Fever, unspecified fever cause  -     SARS Coronavirus 2 Antigen, POCT Manual Read  -     POCT Influenza A/B MOLECULAR      Afebrile. VSS. Patient is in NAD.  Discussed negative results with patient.  Educated patient on viral vs bacterial sinus infection/upper respiratory infection.   Advised patient to begin OTC decongestant, oral antihistamine, and Flonase for symptom relief.    Increase fluid intake and plenty of rest.  Tylenol/Ibuprofen (as permitted) as needed for any pain or discomfort.  If symptoms do not resolve, return to clinic for further evaluation.  ER precautions given such as SOB, CP, or fever not resolved with fever-reducing medications.

## 2024-10-07 ENCOUNTER — TELEPHONE (OUTPATIENT)
Dept: PSYCHOLOGY | Facility: CLINIC | Age: 24
End: 2024-10-07
Payer: COMMERCIAL

## 2024-10-17 ENCOUNTER — OFFICE VISIT (OUTPATIENT)
Dept: PSYCHOLOGY | Facility: CLINIC | Age: 24
End: 2024-10-17
Payer: COMMERCIAL

## 2024-10-17 DIAGNOSIS — F50.010 MILD RESTRICTING TYPE ANOREXIA NERVOSA: Primary | ICD-10-CM

## 2024-10-17 DIAGNOSIS — F90.0 ADHD, PREDOMINANTLY INATTENTIVE TYPE: ICD-10-CM

## 2024-10-17 PROCEDURE — 90834 PSYTX W PT 45 MINUTES: CPT | Mod: 95,,, | Performed by: PSYCHOLOGIST

## 2024-10-17 NOTE — PROGRESS NOTES
"  .       Psychology Outpatient Teleheath Clinic  Psychotherapy Progress Note                 Lida Reed     Date of Visit:   10/17/2024   YOB: 2000   Diagnosis:   Anorexia Nervosa, restricting type; ADHD; Unspecified Depressed mood  Time Seen:  3:00-3:55 PM  Billing code(s): 15988 +95 (telehealth modifier)    Telehealth Information  Originating Site: patient's house at [address] [place] [place]    Distant Site:   [facility]    The patient understands the limitations of telepsychology evaluations and was informed of access to in-person follow-up if desired or needed.  Patient/Family member's identity was confirmed and confidentiality/privacy confirmed prior to visit. I certify that this visit was done via secure two-way simultaneous audio and video transmission with informed consent of the patient and/or guardian.       Diagnoses:  Patient Active Problem List   Diagnosis    ADHD, predominantly inattentive type    Other specific developmental learning difficulties    Pain in right shin    Eating problem    Anorexia nervosa, restricting type         Progress Note  Psychologist met with Lida via telehealth platform to address anorexia nervosa. Has been having a lot of anxiety and uncontrollable worry recently, worse in Aug, panic attacks, restricting more. 5th year of college. 1 really bad week--nausea in am and then not wating to eat.  Can't sleep through the night--wakig up 2-3 times at night ( min). Goal grad school for social work.    Uncontrollable worry biggest: - can last an hour at a time - frequency avg once per day, sometimes more  - what if I die in my sleep  - when driving, what if I crash, car breaks down, etc  - fixating on what I need to do the next day - what if I don't get everything done  - worries about future ("what am I going to do with my life"   I'm already behind   All my friends are already in grad school.   Worries " about beig a disappointment to parents.   - Feeling bad that it has taken me longer to get through college.    Bad sleep--what if I die in my sleep.    Anticipated Graduation = Aug, sociology major  17 hrs fall  17 hrs spring  6 hours summer  Working 15 hrs/week (7:45a-4p [employer] - [employer])    Eating - back to good routine (x3 meals/day)  Sleep - poor, in bed at 10pm, asleep 10-15min, (up around 12-1am, up til 2. Wake up again at 4, alarm to get out of bed at 6am).  June-July, this bad  Exercise - walking around campus to classes  Water - drinks good amount of water  Medicines - none  No other major changes.    Lida endorses some depressed mood but denies suicidal ideation or thoughts of death/NSSI.       TREATMENT MODALITIES USED:  ACT, CBT      Assessment:  Lida has been experiencing more anxiety recently which prompted her to schedule additional psychology sessions. She will benefit from CBT to address worry and anxiety. Currently, she is at minimal risk of suicidal ideation or self-harm and denies thoughts of SI.      Plan:  Will plan to follow-up in 2-3 weeks for continued psychotherapy.      Johanne Jeter, PhD, ABPP  Licensed & Board Certified Clinical Psychologist

## 2024-10-22 ENCOUNTER — OFFICE VISIT (OUTPATIENT)
Dept: URGENT CARE | Facility: CLINIC | Age: 24
End: 2024-10-22
Payer: COMMERCIAL

## 2024-10-22 VITALS
SYSTOLIC BLOOD PRESSURE: 114 MMHG | OXYGEN SATURATION: 97 % | TEMPERATURE: 98 F | RESPIRATION RATE: 17 BRPM | HEART RATE: 80 BPM | WEIGHT: 102.75 LBS | DIASTOLIC BLOOD PRESSURE: 74 MMHG | HEIGHT: 64 IN | BODY MASS INDEX: 17.54 KG/M2

## 2024-10-22 DIAGNOSIS — J06.9 VIRAL URI: Primary | ICD-10-CM

## 2024-10-22 LAB
CTP QC/QA: YES
SARS-COV-2 AG RESP QL IA.RAPID: NEGATIVE

## 2024-10-22 PROCEDURE — 99214 OFFICE O/P EST MOD 30 MIN: CPT | Mod: S$GLB,,, | Performed by: PHYSICIAN ASSISTANT

## 2024-10-22 PROCEDURE — 87811 SARS-COV-2 COVID19 W/OPTIC: CPT | Mod: QW,S$GLB,, | Performed by: PHYSICIAN ASSISTANT

## 2024-10-22 NOTE — PATIENT INSTRUCTIONS
Sterile saline nasal rinses and flonase 1-2 sprays/nostril up to 2x daily to decongest. Tylenol or motrin for pain/fever. Rest and drink plenty of fluids. Sudafed (pseudoephedrine for severe congestion). Salt water gargles. Rest and drink plenty of fluids. May buy over the counter Chloraseptic Lozenges or spray for severe pain.      Follow up with your doctor for any persistent new fever, or persistent sinus pressure/congestion > 10 days.You need to be seen urgently if you develop any chest pain or shortness of breath.

## 2024-10-22 NOTE — LETTER
October 22, 2024      [facility] [address] [place] [address]  Phone: [phone]  Fax: [phone]       Patient: Lida Reed   YOB: 2000  Date of Visit: 10/22/2024    To Whom It May Concern:    Eloisa Reed  was at [facility] on 10/22/2024. The patient may return to work/school on 10/23/24 with no restrictions. If you have any questions or concerns, or if I can be of further assistance, please do not hesitate to contact me.    Sincerely,    Karis Freitas PA-C  [facility]

## 2024-10-22 NOTE — PROGRESS NOTES
"Subjective:      Patient ID: Lida Reed is a 24 y.o. female.    Vitals:  height is 5' 4" (1.626 m) and weight is 46.6 kg (102 lb 11.8 oz). Her temperature is 98.2 °F (36.8 °C). Her blood pressure is 114/74 and her pulse is 80. Her respiration is 17 and oxygen saturation is 97%.     Chief Complaint: Sinus Problem    Patient presents with sinus problem. symptons started yesterday.congestion, sore throat, sneezing, dry cough. No fever, CP, SOB.       Sinus Problem  This is a new problem. The current episode started yesterday. The problem is unchanged. There has been no fever. Her pain is at a severity of 6/10. The pain is mild. Associated symptoms include chills, congestion, coughing, a hoarse voice, sneezing and a sore throat. Pertinent negatives include no diaphoresis, ear pain, headaches, neck pain, shortness of breath, sinus pressure or swollen glands. Treatments tried: mucinex. The treatment provided mild relief.       Constitution: Positive for chills. Negative for sweating and fever.   HENT:  Positive for congestion and sore throat. Negative for ear pain and sinus pressure.    Neck: Negative for neck pain.   Eyes:  Negative for eye itching, eye pain and eye redness.   Respiratory:  Positive for cough. Negative for shortness of breath.    Gastrointestinal:  Negative for abdominal pain, nausea, vomiting and diarrhea.   Genitourinary:  Negative for dysuria, frequency, urgency, urine decreased and flank pain.   Allergic/Immunologic: Positive for sneezing.   Neurological:  Negative for headaches.      Objective:     Physical Exam   Constitutional: She is oriented to person, place, and time. She appears well-developed. She is cooperative.  Non-toxic appearance. She does not appear ill. No distress.   HENT:   Head: Normocephalic and atraumatic.   Ears:   Right Ear: Hearing, tympanic membrane, external ear and ear canal normal.   Left Ear: Hearing, tympanic membrane, external ear and ear canal normal.   Nose: " Congestion present. No mucosal edema, rhinorrhea or nasal deformity. No epistaxis. Right sinus exhibits no maxillary sinus tenderness and no frontal sinus tenderness. Left sinus exhibits no maxillary sinus tenderness and no frontal sinus tenderness.   Mouth/Throat: Uvula is midline and mucous membranes are normal. No trismus in the jaw. Normal dentition. No uvula swelling. Posterior oropharyngeal erythema (mild hyperemia) present. No oropharyngeal exudate or posterior oropharyngeal edema.   Eyes: Conjunctivae and lids are normal. No scleral icterus.   Neck: Trachea normal and phonation normal. Neck supple. No edema present. No erythema present. No neck rigidity present.   Cardiovascular: Normal rate, regular rhythm, normal heart sounds and normal pulses.   Pulmonary/Chest: Effort normal and breath sounds normal. No respiratory distress. She has no decreased breath sounds. She has no wheezes. She has no rhonchi.   Abdominal: Normal appearance.   Musculoskeletal: Normal range of motion.         General: No deformity. Normal range of motion.   Lymphadenopathy:     She has no cervical adenopathy.   Neurological: She is alert and oriented to person, place, and time. She exhibits normal muscle tone. Coordination normal.   Skin: Skin is warm, dry, intact, not diaphoretic and not pale.   Psychiatric: Her speech is normal and behavior is normal. Judgment and thought content normal.   Nursing note and vitals reviewed.    Results for orders placed or performed in visit on 10/22/24   SARS Coronavirus 2 Antigen, POCT Manual Read    Collection Time: 10/22/24  8:40 AM   Result Value Ref Range    SARS Coronavirus 2 Antigen Negative Negative     Acceptable Yes        Assessment:     1. Viral URI        Plan:       Viral URI  -     SARS Coronavirus 2 Antigen, POCT Manual Read    Karis Freitas PA-C  [facility]       Patient Instructions   Sterile saline nasal rinses and flonase 1-2 sprays/nostril up  to 2x daily to decongest. Tylenol or motrin for pain/fever. Rest and drink plenty of fluids. Sudafed (pseudoephedrine for severe congestion). Salt water gargles. Rest and drink plenty of fluids. May buy over the counter Chloraseptic Lozenges or spray for severe pain.      Follow up with your doctor for any persistent new fever, or persistent sinus pressure/congestion > 10 days.You need to be seen urgently if you develop any chest pain or shortness of breath.           Medical Decision Making:   Clinical Tests:   Lab Tests: Ordered and Reviewed  Urgent Care Management:  Consistent with viral etiology

## 2024-10-23 ENCOUNTER — ON-DEMAND VIRTUAL (OUTPATIENT)
Dept: URGENT CARE | Facility: CLINIC | Age: 24
End: 2024-10-23
Payer: COMMERCIAL

## 2024-10-23 DIAGNOSIS — J11.1 INFLUENZA: ICD-10-CM

## 2024-10-23 DIAGNOSIS — J06.9 UPPER RESPIRATORY TRACT INFECTION, UNSPECIFIED TYPE: Primary | ICD-10-CM

## 2024-10-23 PROCEDURE — 99213 OFFICE O/P EST LOW 20 MIN: CPT | Mod: CC,95,S$GLB, | Performed by: FAMILY MEDICINE

## 2024-10-23 RX ORDER — OSELTAMIVIR PHOSPHATE 75 MG/1
75 CAPSULE ORAL 2 TIMES DAILY
Qty: 10 EACH | Refills: 0 | Status: SHIPPED | OUTPATIENT
Start: 2024-10-23 | End: 2024-10-28

## 2024-10-23 NOTE — PROGRESS NOTES
Subjective:      Patient ID: Lida Reed is a 24 y.o. female.    Vitals:  vitals were not taken for this visit.     Chief Complaint: Influenza      Visit Type: TELE AUDIOVISUAL    Present with the patient at the time of consultation: TELEMED PRESENT WITH PATIENT: None    Past Medical History:   Diagnosis Date    Adopted child     Attention deficit disorder without mention of hyperactivity     Chondromalacia of patella     Constipation - functional     Maternal drug abuse affecting fetus or      Other specific developmental learning difficulties     Otitis media     Patella, chondromalacia     Secondary oligomenorrhea 2020     Past Surgical History:   Procedure Laterality Date    TYMPANOSTOMY TUBE PLACEMENT      x2     Review of patient's allergies indicates:  No Known Allergies  Current Outpatient Medications on File Prior to Visit   Medication Sig Dispense Refill    FLUoxetine 20 MG capsule Take 1 capsule (20 mg total) by mouth once daily. 30 capsule 1    OLANZapine (ZYPREXA) 5 MG tablet Take 1 tablet (5 mg total) by mouth every evening. 30 tablet 1     No current facility-administered medications on file prior to visit.     Family History   Adopted: Yes   Problem Relation Name Age of Onset    Other Mother          drug abuse    No Known Problems Father      No Known Problems Sister      No Known Problems Brother      No Known Problems Maternal Aunt      No Known Problems Maternal Uncle      No Known Problems Paternal Aunt      No Known Problems Paternal Uncle      No Known Problems Maternal Grandmother      No Known Problems Maternal Grandfather      No Known Problems Paternal Grandmother      No Known Problems Paternal Grandfather      Melanoma Neg Hx      Acne Neg Hx      Eczema Neg Hx      Lupus Neg Hx      Psoriasis Neg Hx      Amblyopia Neg Hx      Blindness Neg Hx      Cancer Neg Hx      Cataracts Neg Hx      Diabetes Neg Hx      Glaucoma Neg Hx      Hypertension Neg Hx      Macular  degeneration Neg Hx      Retinal detachment Neg Hx      Strabismus Neg Hx      Stroke Neg Hx      Thyroid disease Neg Hx         Medications Ordered                [id number] - [place] - [address] [place]   [address], [place] [address]    Telephone: [phone]   Fax: [phone]   Hours: Not open 24 hours                         E-Prescribed (1 of 1)              oseltamivir (TAMIFLU) 75 MG capsule    Sig: Take 1 capsule (75 mg total) by mouth 2 (two) times daily. for 5 days       Start: 10/23/24     Quantity: 10 each Refills: 0                           [facility] Peq Odvv Intake    10/23/2024  8:34 AM CDT - Filed by Patient   What is your current physical address in the event of a medical emergency? [address]   Are you able to take your vital signs? No   Please attach any relevant images or files    Is your employer contracted with [facility]? No         Was seen in [facility] yesterday and tested neg for covid. Going to concert in 3 days and wants to get better. 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 Since yesterday, fever and severe body aches.    Influenza  This is a new problem. The current episode started in the past 7 days (2 days). The problem occurs constantly. The problem has been rapidly worsening. Associated symptoms include chills, coughing, diaphoresis, a fever and myalgias. The symptoms are aggravated by sneezing and coughing. The treatment provided no relief.       Constitution: Positive for chills, sweating and fever.   HENT:  Positive for postnasal drip and voice change.    Respiratory:  Positive for cough and sputum production.    Musculoskeletal:  Positive for pain and muscle ache.        Objective:   The physical exam was conducted virtually.  Physical Exam   Constitutional: She is oriented to person, place, and time. She appears well-developed. No distress.   HENT:   Head: Normocephalic and atraumatic.   Mouth/Throat: Mucous membranes are  normal.   Hoarse voice      Comments: Hoarse voice  Eyes: Conjunctivae are normal. No scleral icterus.   Pulmonary/Chest: Effort normal. No respiratory distress.   Musculoskeletal: Normal range of motion.         General: Normal range of motion.   Neurological: She is alert and oriented to person, place, and time.   Skin: Skin is warm, dry and not diaphoretic.   Psychiatric: Her behavior is normal. Judgment and thought content normal.   Vitals reviewed.      Assessment:     1. Upper respiratory tract infection, unspecified type    2. Influenza        Plan:       Upper respiratory tract infection, unspecified type    Influenza  -     oseltamivir (TAMIFLU) 75 MG capsule; Take 1 capsule (75 mg total) by mouth 2 (two) times daily. for 5 days  Dispense: 10 each; Refill: 0

## 2024-10-23 NOTE — LETTER
October 23, 2024      [facility] [address] [place] [address]  Phone: [phone]  Fax: [phone]       Patient: Lida Reed   YOB: 2000  Date of Visit: 10/23/2024    To Whom It May Concern:    Eloisa Reed  was at [facility] on 10/23/2024. The patient may return to work/school on 10/28/24 with no restrictions. If you have any questions or concerns, or if I can be of further assistance, please do not hesitate to contact me.    Sincerely,      Bill Retana MD

## 2024-10-24 ENCOUNTER — PATIENT MESSAGE (OUTPATIENT)
Dept: PSYCHOLOGY | Facility: CLINIC | Age: 24
End: 2024-10-24
Payer: COMMERCIAL

## 2024-11-05 ENCOUNTER — TELEPHONE (OUTPATIENT)
Dept: PSYCHOLOGY | Facility: CLINIC | Age: 24
End: 2024-11-05
Payer: COMMERCIAL

## 2024-11-05 NOTE — TELEPHONE ENCOUNTER
Called to speak to the patient about the appointment request for . No answer. Left an message for the patient to return call. Callback number provided

## 2024-11-14 ENCOUNTER — OFFICE VISIT (OUTPATIENT)
Dept: PSYCHOLOGY | Facility: CLINIC | Age: 24
End: 2024-11-14
Payer: COMMERCIAL

## 2024-11-14 ENCOUNTER — PATIENT MESSAGE (OUTPATIENT)
Dept: PSYCHOLOGY | Facility: CLINIC | Age: 24
End: 2024-11-14

## 2024-11-14 DIAGNOSIS — F50.011 MODERATE RESTRICTING TYPE ANOREXIA NERVOSA: Primary | ICD-10-CM

## 2024-11-14 DIAGNOSIS — F90.0 ADHD, PREDOMINANTLY INATTENTIVE TYPE: ICD-10-CM

## 2024-11-14 DIAGNOSIS — F41.1 GAD (GENERALIZED ANXIETY DISORDER): ICD-10-CM

## 2024-11-14 NOTE — PROGRESS NOTES
.         Psychology Outpatient Clinic  Psychotherapy Progress Note       Patient Name:  Lida Reed  Date of Visit:   11/14/2024   YOB: 2000   Diagnosis:   Anorexia Nervosa, restricting type; ADHD; Generalized Anxiety  Time Seen:  8:03-8:59 AM  Billing code(s): 69850 +95 (telehealth modifier)    Telehealth Information  Originating Site: patient's house at [address] [place]    Distant Site:   [facility]    The patient understands the limitations of telepsychology evaluations and was informed of access to in-person follow-up if desired or needed.  Patient/Family member's identity was confirmed and confidentiality/privacy confirmed prior to visit. I certify that this visit was done via secure two-way simultaneous audio and video transmission with informed consent of the patient and/or guardian.       Diagnoses:  Patient Active Problem List   Diagnosis    ADHD, predominantly inattentive type    Other specific developmental learning difficulties    Pain in right shin    Eating problem    Anorexia nervosa, restricting type         Progress Note:  Psychologist met with Lida via telehealth platform to address anorexia nervosa.  Recently parents noticed that Lida has been losing weight, and Lida has also noted that she needs more support with both her anxiety and maintaining her recovery from anorexia. She has recently lost 15 lbs since her last stable weight. She also started meeting with her previous registered dietician--Radha Siddiqi RD (yesterday first time in 1 year). Lida will be meeting with her weekly, and she will be doing weights--Lida will be seeing her weekly too. Was 104 lbs yesterday (last year was 106 lbs). Lida has remained stressed about school; however, she has noticed that her frequency of intense anxiety and feelings of panic have decreased since she decided to start meeting with her RD again and acknowledged her  difficulties with eating as needing more support.     She is thinking about restarting medications (previously prescribed Prozac and Zyprexa). She will plan to discuss this with her parents and with psychiatry. We discussed a plan to keep ongoing communication with her parents and with her RD for team management of her anorexia. In session, we worked on using CBT skills for management of anxiety as well as management of difficult/negative thoughts around eating and body image.      TREATMENT MODALITIES USED:  ACT, CBT      Assessment:  Lida has recently had more symptoms of anxiety and more difficulties with eating; however, she is acknowledging the importance of working to maintain her previous gains and to more actively work on her eating disorder in the context of psychosocial stressors. Currently, she denies depression and is at minimal risk of suicidal ideation or self-harm. She denies thoughts of SI.      Plan:  Will plan to follow-up in 2-3 weeks for continued psychotherapy.  I will collaborate with RD this week.  My communication with parents will be appointment dates and status of collaboration with RD.  I will send her an DELLA to talk to her parents and with RD.        Johanne Jeter, PhD, ABPP  Licensed & Board-Certified Clinical Psychologist  Pediatric Psychology  [facility]

## 2024-11-20 ENCOUNTER — TELEPHONE (OUTPATIENT)
Dept: PSYCHOLOGY | Facility: CLINIC | Age: 24
End: 2024-11-20
Payer: COMMERCIAL

## 2024-11-21 ENCOUNTER — OFFICE VISIT (OUTPATIENT)
Dept: PSYCHOLOGY | Facility: CLINIC | Age: 24
End: 2024-11-21
Payer: COMMERCIAL

## 2024-11-21 DIAGNOSIS — F41.1 GAD (GENERALIZED ANXIETY DISORDER): ICD-10-CM

## 2024-11-21 DIAGNOSIS — F50.011 MODERATE RESTRICTING TYPE ANOREXIA NERVOSA: Primary | ICD-10-CM

## 2024-11-21 DIAGNOSIS — F90.0 ADHD, PREDOMINANTLY INATTENTIVE TYPE: ICD-10-CM

## 2024-11-21 PROCEDURE — 90834 PSYTX W PT 45 MINUTES: CPT | Mod: 95,,, | Performed by: PSYCHOLOGIST

## 2024-11-21 NOTE — PROGRESS NOTES
Psychology Outpatient Clinic  Psychotherapy Progress Note       Patient Name:  Lida Reed  Date of Visit:   11/21/2024   YOB: 2000   Diagnosis:   Anorexia Nervosa, restricting type; ADHD; Generalized Anxiety  Time Seen:  4:00-4:50 PM  Billing code(s): 78639 +95 (telehealth modifier)    Telehealth Information  Originating Site: patient's house at [address] [place]    Distant Site:   [facility]    The patient understands the limitations of telepsychology evaluations and was informed of access to in-person follow-up if desired or needed.  Patient/Family member's identity was confirmed and confidentiality/privacy confirmed prior to visit. I certify that this visit was done via secure two-way simultaneous audio and video transmission with informed consent of the patient and/or guardian.       Diagnoses:  Patient Active Problem List   Diagnosis    ADHD, predominantly inattentive type    Other specific developmental learning difficulties    Pain in right shin    Eating problem    Anorexia nervosa, restricting type         Progress Note:  Psychologist met with Lida via telehealth platform to address anorexia nervosa.  Recently parents noticed that Lida has been losing weight, and Lida has also noted that she needs more support with both her anxiety and maintaining her recovery from anorexia. She has recently lost 15 lbs since her last stable weight. She also started meeting with her previous registered dietician--Radha Siddiqi RD (yesterday first time in 1 year). Lida will be meeting with her weekly, and she will be doing weights--Lida will be seeing her weekly too. Was 104 lbs yesterday (last year was 106 lbs). Lida has remained stressed about school; however, she has noticed that her frequency of intense anxiety and feelings of panic have decreased since she decided to start meeting with her RD again and acknowledged her  difficulties with eating as needing more support.     She is thinking about restarting medications (previously prescribed Prozac and Zyprexa). She will plan to discuss this with her parents and with psychiatry. We discussed a plan to keep ongoing communication with her parents and with her RD for team management of her anorexia. In session, we worked on using CBT skills for management of anxiety as well as management of difficult/negative thoughts around eating and body image.      TREATMENT MODALITIES USED:  ACT, CBT      Assessment:  Lida has recently had more symptoms of anxiety and more difficulties with eating; however, she is acknowledging the importance of working to maintain her previous gains and to more actively work on her eating disorder in the context of psychosocial stressors. Currently, she denies depression and is at minimal risk of suicidal ideation or self-harm. She denies thoughts of SI.      Plan:  Will plan to follow-up in 2-3 weeks for continued psychotherapy.  I will collaborate with RD this week.  My communication with parents will be appointment dates and status of collaboration with RD.  I will send her an DELLA to talk to her parents and with RD.        Johanne Jeter, PhD, ABPP  Licensed & Board-Certified Clinical Psychologist  Pediatric Psychology  [facility]

## 2024-11-25 ENCOUNTER — TELEPHONE (OUTPATIENT)
Dept: PSYCHOLOGY | Facility: CLINIC | Age: 24
End: 2024-11-25
Payer: COMMERCIAL

## 2024-11-25 NOTE — TELEPHONE ENCOUNTER
Called to confirm 9:00 am virtual appointment on 11/26. No answer. [facility] with callback number provided.

## 2024-11-26 ENCOUNTER — OFFICE VISIT (OUTPATIENT)
Dept: PSYCHOLOGY | Facility: CLINIC | Age: 24
End: 2024-11-26
Payer: COMMERCIAL

## 2024-11-26 DIAGNOSIS — F90.0 ADHD, PREDOMINANTLY INATTENTIVE TYPE: ICD-10-CM

## 2024-11-26 DIAGNOSIS — F41.1 GAD (GENERALIZED ANXIETY DISORDER): ICD-10-CM

## 2024-11-26 DIAGNOSIS — F50.011 MODERATE RESTRICTING TYPE ANOREXIA NERVOSA: Primary | ICD-10-CM

## 2024-11-26 PROCEDURE — 90834 PSYTX W PT 45 MINUTES: CPT | Mod: 95,,, | Performed by: PSYCHOLOGIST

## 2024-11-26 NOTE — PROGRESS NOTES
Psychology Outpatient Clinic  Psychotherapy Progress Note       Patient Name:  Lida Reed  Date of Visit:   11/26/2024   YOB: 2000   Diagnosis:   Anorexia Nervosa, restricting type; ADHD; Generalized Anxiety  Time Seen:  9:00-9:50 AM  Billing code(s): 72664 +95 (telehealth modifier)    Telehealth Information  Originating Site: patient's house at [address] [place]    Distant Site:   [facility]    The patient understands the limitations of telepsychology evaluations and was informed of access to in-person follow-up if desired or needed.  Patient/Family member's identity was confirmed and confidentiality/privacy confirmed prior to visit. I certify that this visit was done via secure two-way simultaneous audio and video transmission with informed consent of the patient and/or guardian.       Diagnoses:  Patient Active Problem List   Diagnosis    ADHD, predominantly inattentive type    Other specific developmental learning difficulties    Pain in right shin    Eating problem    Anorexia nervosa, restricting type         Progress Note:  Psychologist met with Lida via telehealth platform to address anorexia nervosa.  Reviewed goals of this course of psychotherapy:  Increasing skills for managing stress in a heathy way.  3 high anxiety/panic management  3 mild/moderate stress management skills  Increasing awareness of stress levels and what is driving the stress.  Being more goal oriented/future oriented.  2 long-term goals  For each long-term goal, have at least 2 short-term goals that help you get closer.  Continue working toward goals of weight restoration and maintenance once restored.  Managing weekly stressors related to eating.  Being able to effectively manage eating disorder    Stressed about managing comments from family members moreso than food.      Recently parents noticed that Lida has been losing weight, and Lida has  also noted that she needs more support with both her anxiety and maintaining her recovery from anorexia. She has recently lost 15 lbs since her last stable weight. She also started meeting with her previous registered dietician--Radha Siddiqi RD (yesterday first time in 1 year). Lida will be meeting with her weekly, and she will be doing weights--Lida will be seeing her weekly too. Was 104 lbs yesterday (last year was 106 lbs). Lida has remained stressed about school; however, she has noticed that her frequency of intense anxiety and feelings of panic have decreased since she decided to start meeting with her RD again and acknowledged her difficulties with eating as needing more support.     She is thinking about restarting medications (previously prescribed Prozac and Zyprexa). She will plan to discuss this with her parents and with psychiatry. We discussed a plan to keep ongoing communication with her parents and with her RD for team management of her anorexia. In session, we worked on using CBT skills for management of anxiety as well as management of difficult/negative thoughts around eating and body image.      TREATMENT MODALITIES USED:  ACT, CBT      Assessment:  Lida has recently had more symptoms of anxiety and more difficulties with eating; however, she is acknowledging the importance of working to maintain her previous gains and to more actively work on her eating disorder in the context of psychosocial stressors. Currently, she denies depression and is at minimal risk of suicidal ideation or self-harm. She denies thoughts of SI.      Plan:  Will plan to follow-up in 2-3 weeks for continued psychotherapy.  I will collaborate with RD this week.  My communication with parents will be appointment dates and status of collaboration with YAMILA.  I will send her an DELLA to talk to her parents and with RD.        Johanne Jeter, PhD, ABPP  Licensed & Board-Certified Clinical Psychologist  Pediatric  Psychology  [facility]

## 2024-12-04 ENCOUNTER — TELEPHONE (OUTPATIENT)
Dept: PSYCHOLOGY | Facility: CLINIC | Age: 24
End: 2024-12-04
Payer: COMMERCIAL

## 2024-12-04 NOTE — TELEPHONE ENCOUNTER
Called to confirm 4:00 pm virtual appointment on 12/5. No answer. [facility] with callback number provided.

## 2024-12-05 ENCOUNTER — OFFICE VISIT (OUTPATIENT)
Dept: PSYCHOLOGY | Facility: CLINIC | Age: 24
End: 2024-12-05
Payer: COMMERCIAL

## 2024-12-05 DIAGNOSIS — F90.0 ADHD, PREDOMINANTLY INATTENTIVE TYPE: ICD-10-CM

## 2024-12-05 DIAGNOSIS — F50.011 MODERATE RESTRICTING TYPE ANOREXIA NERVOSA: Primary | ICD-10-CM

## 2024-12-05 DIAGNOSIS — F41.1 GAD (GENERALIZED ANXIETY DISORDER): ICD-10-CM

## 2024-12-05 PROCEDURE — 90834 PSYTX W PT 45 MINUTES: CPT | Mod: 95,,, | Performed by: PSYCHOLOGIST

## 2024-12-05 NOTE — PROGRESS NOTES
Psychology Outpatient Clinic  Psychotherapy Progress Note       Patient Name:  Lida Reed  Date of Visit:   12/5/2024   YOB: 2000   Diagnosis:   Anorexia Nervosa, restricting type; ADHD; Generalized Anxiety  Time Seen:  4:00-4:50 PM  Billing code(s): 26805 +95 (telehealth modifier)    Telehealth Information  Originating Site: patient's house at [address] [place]    Distant Site:   [facility]    The patient understands the limitations of telepsychology evaluations and was informed of access to in-person follow-up if desired or needed.  Patient/Family member's identity was confirmed and confidentiality/privacy confirmed prior to visit. I certify that this visit was done via secure two-way simultaneous audio and video transmission with informed consent of the patient and/or guardian.       Diagnoses:  Patient Active Problem List   Diagnosis    ADHD, predominantly inattentive type    Other specific developmental learning difficulties    Pain in right shin    Eating problem    Anorexia nervosa, restricting type         Progress Note:  Psychologist met with Lida via telehealth platform to address anorexia nervosa.  She has been having more thoughts about her adoption status and how this relates to fears of being abandoned.  Psychologist processed this with Lida in context of her current therapy goals.  Lida was engaged in this exercise and will continue to reflect on this throughout the week. No SI noted or endorsed.    Reviewed goals of this course of psychotherapy:  Increasing skills for managing stress in a heathy way.  3 high anxiety/panic management  3 mild/moderate stress management skills  Increasing awareness of stress levels and what is driving the stress.  Being more goal oriented/future oriented.  2 long-term goals  For each long-term goal, have at least 2 short-term goals that help you get closer.  Continue working  toward goals of weight restoration and maintenance once restored.  Managing weekly stressors related to eating.  Being able to effectively manage eating disorder      TREATMENT MODALITIES USED:  ACT, CBT      Assessment:  Lida has recently had more symptoms of anxiety and more difficulties with eating; however, she is acknowledging the importance of working to maintain her previous gains and to more actively work on her eating disorder in the context of psychosocial stressors. Currently, she denies depression and is at minimal risk of suicidal ideation or self-harm. She denies thoughts of SI.      Plan:  Will plan to continue psychotherapy.        Johanne Jeter, PhD, ABPP  Licensed & Board-Certified Clinical Psychologist  Pediatric Psychology  [facility]

## 2024-12-09 ENCOUNTER — TELEPHONE (OUTPATIENT)
Dept: PSYCHOLOGY | Facility: CLINIC | Age: 24
End: 2024-12-09
Payer: COMMERCIAL

## 2024-12-09 NOTE — TELEPHONE ENCOUNTER
Called to confirm 10:00 am virtual appointment on 12/10. No answer. [facility] with callback number provided.

## 2024-12-10 ENCOUNTER — OFFICE VISIT (OUTPATIENT)
Dept: PSYCHOLOGY | Facility: CLINIC | Age: 24
End: 2024-12-10
Payer: COMMERCIAL

## 2024-12-10 DIAGNOSIS — F41.1 GAD (GENERALIZED ANXIETY DISORDER): ICD-10-CM

## 2024-12-10 DIAGNOSIS — F50.010 MILD RESTRICTING TYPE ANOREXIA NERVOSA: Primary | ICD-10-CM

## 2024-12-10 DIAGNOSIS — F90.0 ADHD, PREDOMINANTLY INATTENTIVE TYPE: ICD-10-CM

## 2024-12-10 PROCEDURE — 90847 FAMILY PSYTX W/PT 50 MIN: CPT | Mod: 95,,, | Performed by: PSYCHOLOGIST

## 2024-12-10 NOTE — PROGRESS NOTES
Psychology Outpatient Clinic  Psychotherapy Progress Note       Patient Name:  Lida Reed  Date of Visit:   12/10/2024   YOB: 2000   Diagnosis:   Anorexia Nervosa, restricting type; ADHD; Generalized Anxiety  Time Seen:  10:11-11:05 AM  Billing code(s): 52360 +95 (telehealth modifier)    Telehealth Information  Originating Site: patient's house at [address] [place]    Distant Site:   [facility]    The patient understands the limitations of telepsychology evaluations and was informed of access to in-person follow-up if desired or needed.  Patient/Family member's identity was confirmed and confidentiality/privacy confirmed prior to visit. I certify that this visit was done via secure two-way simultaneous audio and video transmission with informed consent of the patient and/or guardian.       Diagnoses:  Patient Active Problem List   Diagnosis    ADHD, predominantly inattentive type    Other specific developmental learning difficulties    Pain in right shin    Eating problem    Anorexia nervosa, restricting type         Progress Note:  Psychologist met with Lida via telehealth platform for psychological treatment to address anorexia nervosa, generalized anxiety, and ADHD. Continued working toward identified goals. Also discussed her thoughts around adoption and how these impact her current assumptions about herself and others she interacts with.  She reports mood as stable and has been working toward self-care and management of tasks.  She is also continuing to make progress toward her weight restoration goals which is further evidenced by reports from her dietician that weight is slowing increasing in weekly visits.          Reviewed goals of this course of psychotherapy:  Increasing skills for managing stress in a heathy way.  3 high anxiety/panic management  3 mild/moderate stress management skills  Increasing awareness of  stress levels and what is driving the stress.  Being more goal oriented/future oriented.  2 long-term goals  For each long-term goal, have at least 2 short-term goals that help you get closer.  Continue working toward goals of weight restoration and maintenance once restored.  Managing weekly stressors related to eating.  Being able to effectively manage eating disorder      TREATMENT MODALITIES USED:  ACT, CBT      Assessment:  Lida has recently had more symptoms of anxiety and more difficulties with eating; however, she is acknowledging the importance of working to maintain her previous gains and to more actively work on her eating disorder in the context of psychosocial stressors. Currently, she denies depression and is at minimal risk of suicidal ideation or self-harm. She denies thoughts of SI.      Plan:  Will continue weekly psychotherapy to address anxiety, anorexia, and ADHD.  Will continue to coordinate care with RD and parents per Lida's requests.         Johanne Jeter, PhD, ABPP  Licensed & Board-Certified Clinical Psychologist  Pediatric Psychology  [facility]

## 2024-12-16 ENCOUNTER — TELEPHONE (OUTPATIENT)
Dept: PSYCHOLOGY | Facility: CLINIC | Age: 24
End: 2024-12-16
Payer: COMMERCIAL

## 2024-12-16 NOTE — TELEPHONE ENCOUNTER
Called to confirm 2:00 pm virtual appointment on 12/17. No answer. [facility] with callback number provided.

## 2024-12-17 ENCOUNTER — OFFICE VISIT (OUTPATIENT)
Dept: PSYCHOLOGY | Facility: CLINIC | Age: 24
End: 2024-12-17
Payer: COMMERCIAL

## 2024-12-17 DIAGNOSIS — F41.1 GAD (GENERALIZED ANXIETY DISORDER): Primary | ICD-10-CM

## 2024-12-17 DIAGNOSIS — F90.0 ADHD, PREDOMINANTLY INATTENTIVE TYPE: ICD-10-CM

## 2024-12-17 DIAGNOSIS — F50.010 MILD RESTRICTING TYPE ANOREXIA NERVOSA: ICD-10-CM

## 2024-12-17 PROCEDURE — 90834 PSYTX W PT 45 MINUTES: CPT | Mod: 95,,, | Performed by: PSYCHOLOGIST

## 2024-12-17 NOTE — PROGRESS NOTES
"         Psychology Outpatient Clinic  Psychotherapy Progress Note       Patient Name:  Lida Reed  Date of Visit:   12/17/2024   YOB: 2000   Diagnosis:   Anorexia Nervosa, restricting type; ADHD; Generalized Anxiety  Time Seen:  10:11-11:05 AM  Billing code(s): 44856 +95 (telehealth modifier)    Telehealth Information  Originating Site: patient's house at [address] [place]    Distant Site:   [facility]    The patient understands the limitations of telepsychology evaluations and was informed of access to in-person follow-up if desired or needed.  Patient/Family member's identity was confirmed and confidentiality/privacy confirmed prior to visit. I certify that this visit was done via secure two-way simultaneous audio and video transmission with informed consent of the patient and/or guardian.       Diagnoses:  Patient Active Problem List   Diagnosis    ADHD, predominantly inattentive type    Other specific developmental learning difficulties    Pain in right shin    Eating problem    Anorexia nervosa, restricting type         Progress Note:  Psychologist met with Lida via telehealth platform for psychological treatment to address anorexia nervosa, generalized anxiety, and ADHD.   Self-care goals--better since exams finished  Eating goals are going well  Stress--good now, last week terrible.  Mood--"calm"    Goal of obtaining masters in Social work      Continued working toward identified goals. Also discussed her thoughts around adoption and how these impact her current assumptions about herself and others she interacts with.  She reports mood as stable and has been working toward self-care and management of tasks.  She is also continuing to make progress toward her weight restoration goals which is further evidenced by reports from her dietician that weight is slowing increasing in weekly visits.          Reviewed goals of this course " of psychotherapy:  Increasing skills for managing stress in a heathy way.  3 high anxiety/panic management  3 mild/moderate stress management skills  Increasing awareness of stress levels and what is driving the stress.  Being more goal oriented/future oriented.  2 long-term goals  For each long-term goal, have at least 2 short-term goals that help you get closer.  Continue working toward goals of weight restoration and maintenance once restored.  Managing weekly stressors related to eating.  Being able to effectively manage eating disorder      TREATMENT MODALITIES USED:  ACT, CBT      Assessment:  Lida has recently had more symptoms of anxiety and more difficulties with eating; however, she is acknowledging the importance of working to maintain her previous gains and to more actively work on her eating disorder in the context of psychosocial stressors. Currently, she denies depression and is at minimal risk of suicidal ideation or self-harm. She denies thoughts of SI.      Plan:  Will continue weekly psychotherapy to address anxiety, anorexia, and ADHD.  Will continue to coordinate care with RD and parents per Lida's requests.         Johanne Jeter, PhD, ABPP  Licensed & Board-Certified Clinical Psychologist  Pediatric Psychology  [facility]

## 2024-12-20 ENCOUNTER — TELEPHONE (OUTPATIENT)
Dept: PSYCHOLOGY | Facility: CLINIC | Age: 24
End: 2024-12-20
Payer: COMMERCIAL

## 2024-12-20 NOTE — TELEPHONE ENCOUNTER
Called to confirm 2:00 pm virtual appointment on 12/23. No answer. [facility] with callback number provided.

## 2024-12-23 ENCOUNTER — OFFICE VISIT (OUTPATIENT)
Dept: PSYCHOLOGY | Facility: CLINIC | Age: 24
End: 2024-12-23
Payer: COMMERCIAL

## 2024-12-23 DIAGNOSIS — F50.010 MILD RESTRICTING TYPE ANOREXIA NERVOSA: ICD-10-CM

## 2024-12-23 DIAGNOSIS — F41.1 GAD (GENERALIZED ANXIETY DISORDER): Primary | ICD-10-CM

## 2024-12-23 DIAGNOSIS — F90.0 ADHD, PREDOMINANTLY INATTENTIVE TYPE: ICD-10-CM

## 2024-12-23 PROCEDURE — 90834 PSYTX W PT 45 MINUTES: CPT | Mod: 95,,, | Performed by: PSYCHOLOGIST

## 2024-12-23 NOTE — PROGRESS NOTES
Psychology Outpatient Clinic  Psychotherapy Progress Note       Patient Name:  Lida Reed  Date of Visit:   12/23/2024   YOB: 2000   Diagnosis:   Anorexia Nervosa, restricting type; ADHD; Generalized Anxiety  Time Seen:  10:11-11:05 AM  Billing code(s): 98163 +95 (telehealth modifier)    Telehealth Information  Originating Site: parent's house in [place], [place] (in chart)    patient's house at [address] [place]    Distant Site:   [facility]    The patient understands the limitations of telepsychology evaluations and was informed of access to in-person follow-up if desired or needed.  Patient/Family member's identity was confirmed and confidentiality/privacy confirmed prior to visit. I certify that this visit was done via secure two-way simultaneous audio and video transmission with informed consent of the patient and/or guardian.       Diagnoses:  Patient Active Problem List   Diagnosis    ADHD, predominantly inattentive type    Other specific developmental learning difficulties    Pain in right shin    Eating problem    Anorexia nervosa, restricting type         Progress Note:  Psychologist met with Lida via telehealth platform for psychological treatment to address anorexia nervosa, generalized anxiety, and ADHD. Had lost her voice. Continued working toward identified goals. Also discussed her thoughts around adoption and how these impact her current assumptions about herself and others she interacts with.  She reports mood as stable and has been working toward self-care and management of tasks.  She is also continuing to make progress toward her weight restoration goals which is further evidenced by reports from her dietician that weight is slowing increasing in weekly visits.          Reviewed goals of this course of psychotherapy:  Increasing skills for managing stress in a heathy way.  3 high anxiety/panic management  3  mild/moderate stress management skills  Increasing awareness of stress levels and what is driving the stress.  Being more goal oriented/future oriented.  2 long-term goals  For each long-term goal, have at least 2 short-term goals that help you get closer.  Continue working toward goals of weight restoration and maintenance once restored.  Managing weekly stressors related to eating.  Being able to effectively manage eating disorder      TREATMENT MODALITIES USED:  ACT, CBT      Assessment:  Lida has recently had more symptoms of anxiety and more difficulties with eating; however, she is acknowledging the importance of working to maintain her previous gains and to more actively work on her eating disorder in the context of psychosocial stressors. Currently, she denies depression and is at minimal risk of suicidal ideation or self-harm. She denies thoughts of SI.      Plan:  Will continue weekly psychotherapy to address anxiety, anorexia, and ADHD.  Will continue to coordinate care with RD and parents per Lida's requests.         Johanne Jeter, PhD, ABPP  Licensed & Board-Certified Clinical Psychologist  Pediatric Psychology  [facility]

## 2025-01-09 ENCOUNTER — TELEPHONE (OUTPATIENT)
Dept: PSYCHOLOGY | Facility: CLINIC | Age: 25
End: 2025-01-09
Payer: COMMERCIAL

## 2025-01-09 NOTE — TELEPHONE ENCOUNTER
Called to confirm 4:00 virtual appointment on 1/10. No answer. [facility] with callback number provided.

## 2025-01-10 ENCOUNTER — OFFICE VISIT (OUTPATIENT)
Dept: PSYCHOLOGY | Facility: CLINIC | Age: 25
End: 2025-01-10
Payer: COMMERCIAL

## 2025-01-10 DIAGNOSIS — F41.1 GAD (GENERALIZED ANXIETY DISORDER): ICD-10-CM

## 2025-01-10 DIAGNOSIS — F50.010 MILD RESTRICTING TYPE ANOREXIA NERVOSA: Primary | ICD-10-CM

## 2025-01-10 DIAGNOSIS — F90.0 ADHD, PREDOMINANTLY INATTENTIVE TYPE: ICD-10-CM

## 2025-01-10 NOTE — PROGRESS NOTES
Psychology Outpatient Clinic  Psychotherapy Progress Note       Patient Name:  Lida Reed  Date of Visit:   1/10/2025   YOB: 2000   Diagnosis:   Anorexia Nervosa, restricting type; ADHD; Generalized Anxiety  Time Seen:  4:17-5:05 PM  Billing code(s): 29021 +95 (telehealth modifier)    Telehealth Information  Originating Site: parent's house in [place], [place] (in chart)    patient's house at [address] [place]    Distant Site:   [facility]    The patient understands the limitations of telepsychology evaluations and was informed of access to in-person follow-up if desired or needed.  Patient/Family member's identity was confirmed and confidentiality/privacy confirmed prior to visit. I certify that this visit was done via secure two-way simultaneous audio and video transmission with informed consent of the patient and/or guardian.       Diagnoses:  Patient Active Problem List   Diagnosis    ADHD, predominantly inattentive type    Other specific developmental learning difficulties    Pain in right shin    Eating problem    Anorexia nervosa, restricting type         Progress Note:  Psychologist met with Lida via telehealth platform for psychological treatment to address anorexia nervosa, generalized anxiety, and ADHD. Stressed about grades, and difficult past 3 weeks. Discussed strategies for managing stress and reviewed plan for practicing stress management strategies in session to plan for the next week.  Lida interacted and readily participated in session today.     High points-- taking a shower at end of day  Low points-- financial coordination and work      TREATMENT MODALITIES USED:  ACT, CBT      Assessment:  Lida has recently had more symptoms of anxiety and more difficulties with eating; however, she is acknowledging the importance of working to maintain her previous gains and to more actively work on her eating disorder in  the context of psychosocial stressors. She has been working well in psychotherapy and eating behaviors have been stabilizing, even in light of increase stress in the past few weeks. Currently, she denies depression and is at minimal risk of suicidal ideation or self-harm. She denies thoughts of SI.      Plan:  Will continue weekly psychotherapy to address anxiety, anorexia, and ADHD.  Will continue to coordinate care with RD and parents per Lida's requests.         Johanne Jeter, PhD, ABPP  Licensed & Board-Certified Clinical Psychologist  Pediatric Psychology  [facility]

## 2025-01-23 ENCOUNTER — OFFICE VISIT (OUTPATIENT)
Dept: PSYCHOLOGY | Facility: CLINIC | Age: 25
End: 2025-01-23
Payer: COMMERCIAL

## 2025-01-23 DIAGNOSIS — F50.010 MILD RESTRICTING TYPE ANOREXIA NERVOSA: Primary | ICD-10-CM

## 2025-01-23 DIAGNOSIS — F90.0 ADHD, PREDOMINANTLY INATTENTIVE TYPE: ICD-10-CM

## 2025-01-23 DIAGNOSIS — F41.1 GAD (GENERALIZED ANXIETY DISORDER): ICD-10-CM

## 2025-01-23 NOTE — PROGRESS NOTES
Psychology Outpatient Clinic  Psychotherapy Progress Note       Patient Name:  Lida Reed  Date of Visit:   1/23/2025  YOB: 2000   Diagnosis:   Anorexia Nervosa, restricting type; ADHD; Generalized Anxiety  Time Seen:  1:10-2:01 PM  Billing code(s): 26705 +95 (telehealth modifier)    Telehealth Information  Originating Site: parent's house in [place], [place] (in chart)    patient's house at [address] [place]    Distant Site:   [facility]    The patient understands the limitations of telepsychology evaluations and was informed of access to in-person follow-up if desired or needed.  Patient/Family member's identity was confirmed and confidentiality/privacy confirmed prior to visit. I certify that this visit was done via secure two-way simultaneous audio and video transmission with informed consent of the patient and/or guardian.       Diagnoses:  Patient Active Problem List   Diagnosis    ADHD, predominantly inattentive type    Other specific developmental learning difficulties    Pain in right shin    Eating problem    Anorexia nervosa, restricting type         Progress Note:  Psychologist met with Lida via telehealth platform for psychological treatment to address anorexia nervosa, generalized anxiety, and ADHD. She continues to be under higher stress due to differences in schedule and getting used to a new routine. She has been eating well and is continuing to prioritize self-care and medical safety by eating 3 meals per day plus snacks. Psychologist worked with Lida to plan for self-care activities and to ground herself in goals and values, as part of ACT framework and moving toward goals that are meaningful to her. She also feels safe with weather events this week and does not anticipate this will create additional difficulties for her. No SI reported or endorsed in session today.    TREATMENT MODALITIES USED:  ACT,  CBT      Assessment:  Lida has recently had more symptoms of anxiety and more difficulties with eating; however, she is acknowledging the importance of working to maintain her previous gains and to more actively work on her eating disorder in the context of psychosocial stressors. She has been working well in psychotherapy and eating behaviors have been stabilizing, even in light of increase stress in the past few weeks. Currently, she denies depression and is at minimal risk of suicidal ideation or self-harm. She denies thoughts of SI.      Plan:  Will continue weekly psychotherapy to address anxiety, anorexia, and ADHD.  Will continue to coordinate care with RD and parents per Lida's requests.         Johanne Jeter, PhD, ABPP  Licensed & Board-Certified Clinical Psychologist  Pediatric Psychology  [facility]

## 2025-01-28 ENCOUNTER — TELEPHONE (OUTPATIENT)
Dept: PSYCHOLOGY | Facility: CLINIC | Age: 25
End: 2025-01-28
Payer: COMMERCIAL

## 2025-01-28 NOTE — TELEPHONE ENCOUNTER
Called to confirm 4:00 pm virtual appointment on 1/29. No answer. [facility] with callback number provided.

## 2025-02-04 ENCOUNTER — TELEPHONE (OUTPATIENT)
Dept: PSYCHOLOGY | Facility: CLINIC | Age: 25
End: 2025-02-04
Payer: COMMERCIAL

## 2025-02-04 NOTE — TELEPHONE ENCOUNTER
Called to confirm 4:00 pm virtual appointment on 2/5. No answer. [facility] with callback number provided.

## 2025-02-05 ENCOUNTER — OFFICE VISIT (OUTPATIENT)
Dept: PSYCHOLOGY | Facility: CLINIC | Age: 25
End: 2025-02-05
Payer: COMMERCIAL

## 2025-02-05 DIAGNOSIS — F90.0 ADHD, PREDOMINANTLY INATTENTIVE TYPE: ICD-10-CM

## 2025-02-05 DIAGNOSIS — F41.1 GAD (GENERALIZED ANXIETY DISORDER): ICD-10-CM

## 2025-02-05 DIAGNOSIS — F50.010 MILD RESTRICTING TYPE ANOREXIA NERVOSA: Primary | ICD-10-CM

## 2025-02-05 PROCEDURE — 99499 UNLISTED E&M SERVICE: CPT | Mod: 95,,, | Performed by: PSYCHOLOGIST

## 2025-02-05 NOTE — PROGRESS NOTES
Psychology Outpatient Clinic  Psychotherapy Progress Note       Patient Name:  Lida Reed  Date of Visit:   2/5/2025   YOB: 2000   Diagnosis:   Anorexia Nervosa, restricting type; ADHD; Generalized Anxiety  Time Seen:  10 min  Billing code(s): N/A (no LOS)    Telehealth Information  Originating Site: parent's house in [place], [place] (in chart)    patient's house at [address] [place]    Distant Site:   [facility]    The patient understands the limitations of telepsychology evaluations and was informed of access to in-person follow-up if desired or needed.  Patient/Family member's identity was confirmed and confidentiality/privacy confirmed prior to visit. I certify that this visit was done via secure two-way simultaneous audio and video transmission with informed consent of the patient and/or guardian.       Diagnoses:  Patient Active Problem List   Diagnosis    ADHD, predominantly inattentive type    Other specific developmental learning difficulties    Pain in right shin    Eating problem    Anorexia nervosa, restricting type         Progress Note:  Patient signed in for visit, but stated that she had food poisoning and was not feeling well.  Session was rescheduled for Monday 2/10 at 8:30am.      Will continue weekly psychotherapy to address anxiety, anorexia, and ADHD.  Will continue to coordinate care with RD and parents per Lida's requests.         Johanne Jeter, PhD, ABPP  Licensed & Board-Certified Clinical Psychologist  Pediatric Psychology  [facility]

## 2025-02-07 ENCOUNTER — TELEPHONE (OUTPATIENT)
Dept: PSYCHOLOGY | Facility: CLINIC | Age: 25
End: 2025-02-07
Payer: COMMERCIAL

## 2025-02-07 NOTE — TELEPHONE ENCOUNTER
Called to confirm 8:30 virtual appointment on 2/10. No answer. [facility] with callback number provided.

## 2025-02-10 ENCOUNTER — TELEPHONE (OUTPATIENT)
Dept: PSYCHOLOGY | Facility: CLINIC | Age: 25
End: 2025-02-10
Payer: COMMERCIAL

## 2025-02-10 NOTE — TELEPHONE ENCOUNTER
Patient called to reschedule 8:30 am appointment on 2/10. Appointment rescheduled to 10:00am on 2/11. Patient verbalized understanding of appointment date and time.

## 2025-03-07 ENCOUNTER — TELEPHONE (OUTPATIENT)
Dept: PSYCHOLOGY | Facility: CLINIC | Age: 25
End: 2025-03-07
Payer: COMMERCIAL

## 2025-03-10 ENCOUNTER — TELEPHONE (OUTPATIENT)
Dept: PSYCHOLOGY | Facility: CLINIC | Age: 25
End: 2025-03-10
Payer: COMMERCIAL

## 2025-03-10 NOTE — TELEPHONE ENCOUNTER
Called to confirm 11:00 am virtual appointment on 3/1. No answer. [facility] with callback number provided.

## 2025-03-11 ENCOUNTER — OFFICE VISIT (OUTPATIENT)
Dept: PSYCHOLOGY | Facility: CLINIC | Age: 25
End: 2025-03-11
Payer: COMMERCIAL

## 2025-03-11 DIAGNOSIS — F50.011 MODERATE RESTRICTING TYPE ANOREXIA NERVOSA: Primary | ICD-10-CM

## 2025-03-11 DIAGNOSIS — F41.1 GAD (GENERALIZED ANXIETY DISORDER): ICD-10-CM

## 2025-03-11 PROCEDURE — 90834 PSYTX W PT 45 MINUTES: CPT | Mod: 95,,, | Performed by: PSYCHOLOGIST

## 2025-03-11 NOTE — PROGRESS NOTES
Psychology Outpatient Clinic  Psychotherapy Progress Note       Patient Name:  Lida Reed  Date of Visit:   3/11/2025   YOB: 2000   Diagnosis:   Anorexia Nervosa, restricting type; ADHD; Generalized Anxiety  Time Seen:  40 min  Billing code(s): 52466    Telehealth Information  Originating Site: parent's house in [place], [place] (in chart)    patient's house at [address] [place]    Distant Site:   [facility]    The patient understands the limitations of telepsychology evaluations and was informed of access to in-person follow-up if desired or needed.  Patient/Family member's identity was confirmed and confidentiality/privacy confirmed prior to visit. I certify that this visit was done via secure two-way simultaneous audio and video transmission with informed consent of the patient and/or guardian.       Diagnoses:  Patient Active Problem List   Diagnosis    ADHD, predominantly inattentive type    Other specific developmental learning difficulties    Pain in right shin    Eating problem    Anorexia nervosa, restricting type         Progress Note:  Had dietician visit with parents yesterday.  Weight is still trending up--switching to every other week with Radha. She is feeling more secure and feels that this session with her dietician was helpful in her being able to communicate with her parents and have them gain confidence in her progress with maintaining her weight and health. She also asked for this psychologist to adjust how often and under what circumstances updates are provided to family members: Updates to parents will be 1) If I have concerns about mental well-being or safety or if it has been > 1 month since our last session.    In session, we worked on CBT strategies for anxiety and management of mood and goals of stress management. Lida actively participated in session.    Assessment:  Lida is doing well and is stable  with maintaining her weight restoration for anxorexia. She is also making improvements in using CBT skills for anxiety and stress management.    Plan:  Will continue weekly psychotherapy to address anxiety, anorexia, and ADHD.  Will continue to coordinate care with RD and parents per Lida's requests.         Johanne Jeter, PhD, ABPP  Licensed & Board-Certified Clinical Psychologist  Pediatric Psychology  [facility]

## 2025-03-21 ENCOUNTER — TELEPHONE (OUTPATIENT)
Dept: PSYCHOLOGY | Facility: CLINIC | Age: 25
End: 2025-03-21
Payer: COMMERCIAL

## 2025-04-11 ENCOUNTER — TELEPHONE (OUTPATIENT)
Dept: PSYCHOLOGY | Facility: CLINIC | Age: 25
End: 2025-04-11
Payer: COMMERCIAL

## 2025-04-11 NOTE — TELEPHONE ENCOUNTER
Called to confirm 4:00 pm appointment on 4/14. No answer. [facility] with callback number provided.

## 2025-04-14 ENCOUNTER — OFFICE VISIT (OUTPATIENT)
Dept: PSYCHOLOGY | Facility: CLINIC | Age: 25
End: 2025-04-14
Payer: COMMERCIAL

## 2025-04-14 DIAGNOSIS — F33.1 MODERATE EPISODE OF RECURRENT MAJOR DEPRESSIVE DISORDER: ICD-10-CM

## 2025-04-14 DIAGNOSIS — F90.0 ADHD, PREDOMINANTLY INATTENTIVE TYPE: ICD-10-CM

## 2025-04-14 DIAGNOSIS — F50.011 MODERATE RESTRICTING TYPE ANOREXIA NERVOSA: Primary | ICD-10-CM

## 2025-04-14 DIAGNOSIS — F41.1 GAD (GENERALIZED ANXIETY DISORDER): ICD-10-CM

## 2025-04-14 NOTE — PROGRESS NOTES
PSYCHOLOGY   Outpatient Clinic    Psychotherapy Progress Note       Patient:           Lida Reed   YOB: 2000   Date of Visit:  4/14/2025   Time of visit:  4:00-4:50 PM   CPT code(s):  83335 +95 (telehealth modifier)     Diagnosis:    ICD-10-CM ICD-9-CM   1. Moderate restricting type anorexia nervosa  F50.011 307.1   2. Moderate episode of recurrent major depressive disorder  F33.1 296.32   3. THERESA (generalized anxiety disorder)  F41.1 300.02   4. ADHD, predominantly inattentive type  F90.0 314.00        Problem List[1]     Individuals Present: Patient      Telehealth Information  patient's house in [address] Dr[address] Chris Zavala [facility] Site:   [facility]    The patient understands the limitations of telepsychology evaluations and was informed of access to in-person follow-up if desired or needed.  Patient/Family member's identity was confirmed and confidentiality/privacy confirmed prior to visit. I certify that this visit was done via secure two-way simultaneous audio and video transmission with informed consent of the patient and/or guardian.Originating Site:   Patient's home via secure telehealth virtual platform      RELEVANT HISTORY:   Relevant History : Lida is followed in psychology clinic for treatment of Anorexia Nervosa, Generalized anxiety, and depression      PROGRESS - SUBJECTIVE/OBJECTIVE:     Subjective/Objective:  Lida's RD sent an email to this psychologist, patient, and patient's parents regarding increased depressive symptoms and mild weight loss (while still in range of safety). Today Lida reports increased depression symptoms which have been influenced by increased stress. She is also planning to focus on work and not take classes during summer or fall semesters. Psychologist praised Lida and helped her to take stock in progress made thus far toward her goals and her recent efforts toward maintaing her medical  and health safety. Completed updated PHQ-9 and completed safety risk assessment today.  No suicidal ideation/intent was reported or endorsed.     Lida is scheduled to start with a new psychiatrist in [place], with first appointment on Wed of this week: Dr. Neva Perez    Discussed whether she would like to transition to a therapist who can see her in-person in [place] since her other health providers are there and she is planning to stay in [place] for the foreseeable future. She is interested in this, and we discussed facilitating a transition plan and support with finding an appropriate therapist for her.    PHQ-9 Depression screenin. Little interest or pleasure in doing things?  More than half of days  = 2  2. Feeling down, depressed, or hopeless?  More than half of days  = 2  3. Trouble falling or staying asleep, or sleeping too much?  Nearly every day           = 3  4. Feeling tired or having little energy?  Nearly every day           = 3  5. Poor appetite or overeating?  More than half of days  = 2  6. Feeling bad about yourself- or that you are a failure or have let yourself or your family down?  Several days                = 1  7. Trouble concentrating on things, such as reading the newspaper or watching TV?  More than half of days  = 2  8. Moving or speaking so slowly that other people could have noticed? Or the opposite- being so fidgety or restless that you have been moving around a lot more than usual?  Several days                = 1  9. Thoughts that you would be better off dead or of hurting yourself in some way? no, Not at all                       = 0    How difficult have these problems made it for you to do your work, take care of things at home, or get along with other people?  Nearly every day           = 3    Screening Measures:  PHQ-9 total = 16  (moderately severe depression); item 9 = 0 (no SI/thoughts of death)      Treatment Modalities Used:  CBT      ASSESSMENT &  PLAN:     Assessment:  Lida has recently had more symptoms of depression and worsening anorexia symptoms related to increased psychosocial stress. However, she has gained appropriate insight and already started to make positive changes to eating habits and daily routines to prioritize safety and self-care, which is a significant improvement compared to her history of previous episodes of worsened ED symptoms. She is motivated to pursue psychiatry interventions which is likely to increase efficacy of her treatment plan. She denies thoughts of SI but is experiencing worsened depression.      Recommendations/Plan:  1.  Will continue to follow, while supporting her in transitioning to a new therapist in [place] who can support with in-person appointments.  2.  Will send update email to family and RD with updates to her status and plans for transitioning care.        Johanne Jeter, PhD, ABPP  Licensed & Board-Certified Clinical Psychologist  Pediatric Psychology  [facility]                                               [1]   Patient Active Problem List  Diagnosis    ADHD, predominantly inattentive type    Writing learning disorder    Pain in right shin    Eating problem    Anorexia nervosa, restricting type    Psychophysiological insomnia    Moderate episode of recurrent major depressive disorder    THERESA (generalized anxiety disorder)

## 2025-04-16 ENCOUNTER — OFFICE VISIT (OUTPATIENT)
Dept: PSYCHIATRY | Facility: CLINIC | Age: 25
End: 2025-04-16
Payer: COMMERCIAL

## 2025-04-16 VITALS
DIASTOLIC BLOOD PRESSURE: 68 MMHG | WEIGHT: 106.69 LBS | BODY MASS INDEX: 18.32 KG/M2 | SYSTOLIC BLOOD PRESSURE: 108 MMHG | HEART RATE: 87 BPM

## 2025-04-16 DIAGNOSIS — F41.1 GAD (GENERALIZED ANXIETY DISORDER): ICD-10-CM

## 2025-04-16 DIAGNOSIS — F33.1 MODERATE EPISODE OF RECURRENT MAJOR DEPRESSIVE DISORDER: Primary | ICD-10-CM

## 2025-04-16 DIAGNOSIS — F50.019 ANOREXIA NERVOSA, RESTRICTING TYPE, UNSPECIFIED SEVERITY: ICD-10-CM

## 2025-04-16 DIAGNOSIS — F90.0 ADHD, PREDOMINANTLY INATTENTIVE TYPE: ICD-10-CM

## 2025-04-16 DIAGNOSIS — F51.04 PSYCHOPHYSIOLOGICAL INSOMNIA: ICD-10-CM

## 2025-04-16 DIAGNOSIS — F81.81 WRITING LEARNING DISORDER: ICD-10-CM

## 2025-04-16 PROCEDURE — 3074F SYST BP LT 130 MM HG: CPT | Mod: CPTII,S$GLB,, | Performed by: PSYCHIATRY & NEUROLOGY

## 2025-04-16 PROCEDURE — 1160F RVW MEDS BY RX/DR IN RCRD: CPT | Mod: CPTII,S$GLB,, | Performed by: PSYCHIATRY & NEUROLOGY

## 2025-04-16 PROCEDURE — 3008F BODY MASS INDEX DOCD: CPT | Mod: CPTII,S$GLB,, | Performed by: PSYCHIATRY & NEUROLOGY

## 2025-04-16 PROCEDURE — G2211 COMPLEX E/M VISIT ADD ON: HCPCS | Mod: S$GLB,,, | Performed by: PSYCHIATRY & NEUROLOGY

## 2025-04-16 PROCEDURE — 99999 PR PBB SHADOW E&M-EST. PATIENT-LVL III: CPT | Mod: PBBFAC,,, | Performed by: PSYCHIATRY & NEUROLOGY

## 2025-04-16 PROCEDURE — 99417 PROLNG OP E/M EACH 15 MIN: CPT | Mod: S$GLB,,, | Performed by: PSYCHIATRY & NEUROLOGY

## 2025-04-16 PROCEDURE — 99215 OFFICE O/P EST HI 40 MIN: CPT | Mod: S$GLB,,, | Performed by: PSYCHIATRY & NEUROLOGY

## 2025-04-16 PROCEDURE — 1159F MED LIST DOCD IN RCRD: CPT | Mod: CPTII,S$GLB,, | Performed by: PSYCHIATRY & NEUROLOGY

## 2025-04-16 PROCEDURE — 3078F DIAST BP <80 MM HG: CPT | Mod: CPTII,S$GLB,, | Performed by: PSYCHIATRY & NEUROLOGY

## 2025-04-16 RX ORDER — FLUOXETINE HYDROCHLORIDE 20 MG/1
20 CAPSULE ORAL DAILY
Qty: 30 CAPSULE | Refills: 0 | Status: SHIPPED | OUTPATIENT
Start: 2025-04-16 | End: 2025-05-16

## 2025-04-16 RX ORDER — OLANZAPINE 2.5 MG/1
2.5 TABLET, FILM COATED ORAL NIGHTLY
Qty: 30 TABLET | Refills: 0 | Status: SHIPPED | OUTPATIENT
Start: 2025-04-16 | End: 2025-05-16

## 2025-04-16 NOTE — PROGRESS NOTES
Outpatient Psychiatry Initial Visit     4/16/2025    Lida Reed, a 25 y.o. female, presenting for Initial Psychiatric Evaluation visit. Met with patient.    Excerpts from Dr. Sarita Armendariz, Ph.D's notes     Psychiatry Initial Visit (PhD/LCSW)     Date: 1/9/13     CPT code: 39063     Referred by: Parents     Chief complaint/reason for encounter:  Psych Diagnostic Interview with parents in preparation for Psychological Testing.  Parents interviewed without patient in order to obtain objective information regarding academic problems       Clinical status of patient:  Outpatient     Met with:  Patients mother and father     History of present illness: No concerns were expressed by school except for reading comprehension. Lida began making low grades in middle school because of her subtle cognitive difficulties and lack of awareness.               Past psychiatric history:  None     Past medical history: Lida is adopted. She went to detox right after birth as her mother was taking methadone during pregnancy. She had tubes placed in her ears twice. She didn't start talking until age 5, an early sign of what is happening now. No other significant medical problems.         Family history of psychiatric illness:Biological mother had a serious heroine problem     Family History Parents  19 years. Father works in administration at [employer], Mother is in business. Lida is the middle child and is adopted. The other two are not: older brother and younger sister. Very strong family support.        Educational History: At [employer] since nursery. Achievement scores average         Social History: no problems. 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       Reason for Encounter: Patient complains of wanting medications for eating disorder         History of Present Illness:   "I been dealing with eating disorder for 5 years." Johanne Jeter in [place] is her therapist since 2020. She focuses on pediatrics. They are switching her to an adult therapist. "My dad is helping me."  She does not have a psychiatrist.  C/o having lot of depression and anxiety    Stressors:  Work -- [employer]. Full time job. 7:30 to 3:30. Looking for a new job. Likes what she is doing but wants to change employers. Doing this since last June.   School -- Sociology Major -- 5th year. She may graduate in May 2026. Very stressful. Always struggled with school. Never enjoyed it. Feels burnt out from school. She is getting advise from the school and her parents. The plan is to wait.    Physical health: eating disorder.      Sleep:  Goes to bed by 10 pm falls asleep easily but cannot stay asleep for longer then one hour. Sleeps about 4 1/2 hours.   No naps    [place]: eats 3 meals and snacks. Before one week ago she was eating less. She had to increase her portions.  Wt: gaining weight -- gained about 2 lbs in last week    Energy: low. Throughout the day. Feels exhausted and restless.     Side effects:   No meds.      ADHD: inattentive, disorganized, forgetful, easily distracted   Depressive Disorder: depressed mood, irritable mood, appetite/weight change, sleep change, tired/fatigued, worthlessness, guilt, concentration problems, hopelessness, social isolation/withdrawal, tearfulness/crying   Anxiety Disorder: anxiety/nervousness, panic attacks, re-experiencing symptoms, fatigue, irritability, muscle tension, sleep problems, concentration problems, excessive worry   Panic Disorder: nervous, dizzy, shaky, difficulty breathing, and sweating   Manic " "Disorder: denied   Psychotic Disorder: She thinks she is average looking; she feels that she needs to gain weight; people call her skinny and that hurts her feelings. IOR +   Substance Use:  Alcohol: occasionally  and Caffeine: once a week        Past Psychiatric Hx:  Age at first treatment was 5th grade. ADHD. She had Vyvanse for a while. Changed to concerta due to weight loss. Weight improved. Stopped meds in Que year high school.   Counseling   In college things changed.   Her weight was always low in school. Doing cross country and track every day. Stopped this in college. Restrictive eating disorder became worse.   Depression/anxiety --   She was in [facility] [place] -- 2 and 1/2 months. It was very helpful. She was in family based therapy with her parents around age 21. Affected her relationship with her parents. Physically she was well mentally not so. Relapsed with her eating disorder and pt decided to go to [facility] to prevent parents to take control of her decision making.  She was prescribed stattera.  But pt is not sure.     Prozac (20 mg) and zyprexa (10 mg) worked well. She was on it for 4 months. She felt she was "cured" and stopped taking it. Stopped 2 years ago. She drifting again one year ago.   Her nutritionist, therapist, and parents advised her to get back on medications.    Eating disorder: anorexic. Restriction. No purging. No over activity or using meds for diarrhea.   NO suicide attempt  NO psych adm         Abuse: denies    Family Psychiatric Hx:  Uncle passed away -- suicide  Mom-- eating disorder   Sister --eating disorder -- 11 months younger. She is doing well .   None of the above members are biologically connected to her    Father: ; used to work at [employer] -  of marketing;   Mom:  and not practicing.  Good relation with parents.  Sibs: one older brother and a younger sister.    Personal/Psychosocial Hx:   B[facility] H[facility]. ADOPTED -- at age 2 weeks. Birth " mom did drugs when she was pregnant with pt.   Does not know about her dad. Closed adoption.   Per Dr. Armendariz's note: She went to detox right after birth as her mother was taking methadone during pregnancy. She had tubes placed in her ears twice. She didn't start talking until age 5, an early sign of what is happening now. No other significant medical problems.     Education:  Enrolled at [employer]   Occupation: Work as above    Marital status: Single; not in a relationship. Broke up but they are good friends. No children    Living situation: Lives in a house with 3 room-mates and 3 dogs. Good with her room mates.     Latter-day: Moravian. Practices.     Legal: Denies   Alcohol & Drug use History:   Alcohol: age at first use: 18. Last drink -- 4 days ago. On the weekends -- Wine/cocktail. Denies being a problems.     MJ -- denies.   Meth/cocaine/pills -- denies     Medical: denies  Periods: Very regular; taking precautions            5/30/2023     9:02 AM   GAD7   1. Feeling nervous, anxious, or on edge? 0   2. Not being able to stop or control worrying? 0   3. Worrying too much about different things? 0   4. Trouble relaxing? 0   5. Being so restless that it is hard to sit still? 0   6. Becoming easily annoyed or irritable? 0   7. Feeling afraid as if something awful might happen? 0   THERESA-7 Score 0      0-4 = Minimal anxiety  5-9 = Mild anxiety  10-14 = Moderate anxiety  15-21 = Severe anxiety             6/9/2020     1:17 PM 7/17/2019     5:34 PM   Depression Patient Health Questionnaire   Over the last two weeks how often have you been bothered by little interest or pleasure in doing things Not at all  Not at all    Over the last two weeks how often have you been bothered by feeling down, depressed or hopeless Not at all  Not at all    PHQ-2 Total Score 0 0   Over the last two weeks how often have you been bothered by trouble falling or staying asleep, or sleeping too much  More than half the days    Over the last two  weeks how often have you been bothered by feeling tired or having little energy  Several days    Over the last two weeks how often have you been bothered by a poor appetite or overeating  More than half the days    Over the last two weeks how often have you been bothered by feeling bad about yourself - or that you are a failure or have let yourself or your family down  Several days    Over the last two weeks how often have you been bothered by trouble concentrating on things, such as reading the newspaper or watching television  More than half the days    Over the last two weeks how often have you been bothered by moving or speaking so slowly that other people could have noticed. Or the opposite - being so fidgety or restless that you have been moving around a lot more than usual.  Not at all    Over the last two weeks how often have you been bothered by thoughts that you would be better off dead, or of hurting yourself  Not at all    If you checked off any problems, how difficult have these problems made it for you to do your work, take care of things at home or get along with other people?  Somewhat difficult    PHQ-9 Score  8        Data saved with a previous flowsheet row definition     0-4 = No intervention  5 to 9 = Mild  10 to 14 = Moderate  15 to 19 = Moderately severe  =20 = Severe      Review Of Systems:     GENERAL:  gained 2 lbs in last one week  SKIN:  No rashes or lacerations  HEAD:  No headaches  EYES:  No exophthalmos, jaundice or blindness  EARS:  No dizziness, tinnitus or hearing loss  NOSE:  No changes in smell  MOUTH & THROAT:  No dyskinetic movements or obvious goiter  CHEST:  No shortness of breath, hyperventilation or cough  CARDIOVASCULAR:  No tachycardia or chest pain  ABDOMEN:  No nausea, vomiting, pain, constipation or diarrhea  URINARY:  No frequency, dysuria or sexual dysfunction  ENDOCRINE:  No polydipsia, polyuria  MUSCULOSKELETAL:  No pain or stiffness of the joints  NEUROLOGIC:  No  weakness, sensory changes, seizures, confusion, memory loss, tremor or other abnormal movements    Current Evaluation:     Nutritional Screening: Considering the patient's height and weight, medications, medical history and preferences, should a referral be made to the dietitian? She is already in nutritional counseling    Constitutional  Vitals:  Most recent vital signs, dated less than 90 days prior to this appointment, were reviewed.    Vitals:    04/16/25 1519   BP: 108/68   Pulse: 87   Weight: 48.4 kg (106 lb 11.2 oz)        General:  unremarkable, age appropriate; thin      Musculoskeletal  Muscle Strength/Tone:  no tremor, no tic   Gait & Station:  non-ataxic     Psychiatric  Appearance: casually dressed & groomed;   Behavior: calm,   Cooperation: cooperative with assessment  Speech: normal rate, volume, tone  Thought Process: linear, goal-directed  Thought Content: No suicidal or homicidal ideation; no delusions; IOR+  Affect: normal range  Mood: anxious, sad  Perceptions: No auditory or visual hallucinations  Level of Consciousness: alert throughout interview  Insight: fair  Cognition: Oriented to person, place, time, & situation  Memory: no apparent deficits to general clinical interview  Attention/Concentration: no apparent deficits to general clinical interview  Fund of Knowledge: average by vocabulary/education    Risk Parameters:  Patient reports no suicidal ideation  Patient reports no homicidal ideation  Patient reports no self-injurious behavior  Patient reports no violent behavior    Laboratory Data  No visits with results within 1 Month(s) from this visit.   Latest known visit with results is:   Office Visit on 10/22/2024   Component Date Value Ref Range Status    SARS Coronavirus 2 Antigen 10/22/2024 Negative  Negative Final     Acceptable 10/22/2024 Yes   Final       Medications  Encounter Medications[1]    Assessment - Diagnosis - Goals:     Impression:   This is a 25 year old  single white female [employer] student who was adopted at birth. She is well known to our system since 2013 when she was about 11 years old. Extensive documentation in chart related to Neurocognitive testing, counseling, and medication treatment.   On birth, pt had gone into opioid withdrawal as her biological mother was on methadone. She did not start talking until about age 5. On formal testing, she was dx to have ADD, LD -- writing/language and adjustment disorder    She was initially treated with Vyvanse which was later changed to Methylphenidate. After age 20, she was diagnosed to have Eating disorder, restrictive type, and was involved in counseling and medication treatment. She spent about 2 [facility]/2 months at [facility]. She was treated with Olanzapine and Prozac combination with good results. She stopped the treatment as she was doing well and decided that she did not need the treatment. Pt reports being involved in Nutritional Counseling as well as IT.     Pt is pursuing BA in Sociology at [employer]. She has very hard time with academics possibly related to her early cognitive issues, ADD and other psychiatry issues. She has taken breaks and plans to take another break and graduate next year.     She has not been hospitalized. She has not attempted suicide. No drugs or alcohol issues reported.     She may have tried Atomoxetine but she does not recall.    Her periods are regular    She denies purging or using OTC related to her eating disorder. She is not over exercising. Denies body dysmorphia.    Pt meets criteria for MDD, THERESA, Eating Disorder. ADD, and insomnia. Pt's BMI is 18.32 with wt of 48.4 kg    Plan:   Reviewed diagnosis and DD. Pt was not aware of LD as one of her diagnosis  Pt agrees with and would like to resume Prozac and Zyprexa.  Prozac 20 mg po daily  Zyprexa 2.5 mg po qhs  Pt is aware of side effects. Reviewed side effects  Check CBC, CMP, Vitamin B12, Folic acid, and TSH.   Pt does not have a PCP. She  will seek an appointment with one through the [facility] system,  Counseling done  Pt is abstinent. She uses Barrier method when sexually active.  Continue therapy  Monitor side effect  Agrees with treatment plan        ICD-10-CM ICD-9-CM   1. Moderate episode of recurrent major depressive disorder  F33.1 296.32   2. ADHD, predominantly inattentive type  F90.0 314.00   3. Anorexia nervosa, restricting type, unspecified severity  F50.019 307.1   4. Psychophysiological insomnia  F51.04 307.42   5. THERESA (generalized anxiety disorder)  F41.1 300.02   6. Writing learning disorder  F81.81 315.2       Treatment Goals:  Specify outcomes written in observable, behavioral terms:   The treatment plan will use a biopsychosocial approach including self help strategies to achieve the goals of optimizing physical and mental health.    Anxiety: Use Psycho education, medications, and therapy as appropriate to decrease anxiety intensity, duration and frequency such that patient will have an improved productivity and QOL.    Depression: decrease or eliminate symptoms of depression using medications with limited/manageable side effects burden. Pt will have an improvement in productivity and QOL.     Eating disorder: monitor weight/BMI, eating patterns, self report, and engage in medications and counseling treatment    ADD/LD: school accomodation; medications and counseling as appropriate    Decrease, eliminate or prevent suicidal thoughts and actions. Use safety plan including hospitalization to prevent suicidality.     Sleep: improve sleep duration, quality and consistency with sleep hygiene and medications. There will be 50% improvement in sleep in 3-6 weeks and 70% or more in 12-24 weeks.      Future treatment will utilize CBT, IOP, Mindfulness Techniques, ACT, Solution-focused Therapy, and Relaxation Techniques .     Treatment Plan/Recommendations:   See above    Safety Plan/Support System:   Pt contracts for safety; pt has good  support system    Return to Clinic:  10 days      Time spent with pt including note preparation: 110 minutes  This includes face to face time and non-face to face time preparing to see the patient (eg, review of tests), obtaining and/or reviewing separately obtained history, documenting clinical information in the electronic or other health record, independently interpreting results and communicating results to the patient/family/caregiver, or care coordinator.          Neva Perez MD  Psychiatry         [1]   Outpatient Encounter Medications as of 4/16/2025   Medication Sig Dispense Refill    FLUoxetine 20 MG capsule Take 1 capsule (20 mg total) by mouth once daily. 30 capsule 0    OLANZapine (ZYPREXA) 2.5 MG tablet Take 1 tablet (2.5 mg total) by mouth every evening. 30 tablet 0     No facility-administered encounter medications on file as of 4/16/2025.

## 2025-04-17 NOTE — PATIENT INSTRUCTIONS

## 2025-04-22 ENCOUNTER — PATIENT MESSAGE (OUTPATIENT)
Dept: PSYCHIATRY | Facility: CLINIC | Age: 25
End: 2025-04-22
Payer: COMMERCIAL

## 2025-04-22 ENCOUNTER — PATIENT MESSAGE (OUTPATIENT)
Dept: PSYCHOLOGY | Facility: CLINIC | Age: 25
End: 2025-04-22
Payer: COMMERCIAL

## 2025-04-29 ENCOUNTER — TELEPHONE (OUTPATIENT)
Dept: PSYCHOLOGY | Facility: CLINIC | Age: 25
End: 2025-04-29
Payer: COMMERCIAL

## 2025-04-29 NOTE — TELEPHONE ENCOUNTER
Called to confirm 8:00am appointment on 4/30. No answer. [facility] with callback number provided.

## 2025-04-30 ENCOUNTER — OFFICE VISIT (OUTPATIENT)
Dept: PSYCHOLOGY | Facility: CLINIC | Age: 25
End: 2025-04-30
Payer: COMMERCIAL

## 2025-04-30 DIAGNOSIS — F33.1 MODERATE EPISODE OF RECURRENT MAJOR DEPRESSIVE DISORDER: ICD-10-CM

## 2025-04-30 DIAGNOSIS — F50.010 MILD RESTRICTING TYPE ANOREXIA NERVOSA: Primary | ICD-10-CM

## 2025-04-30 DIAGNOSIS — F90.0 ADHD, PREDOMINANTLY INATTENTIVE TYPE: ICD-10-CM

## 2025-04-30 DIAGNOSIS — F41.1 GAD (GENERALIZED ANXIETY DISORDER): ICD-10-CM

## 2025-04-30 PROCEDURE — 90834 PSYTX W PT 45 MINUTES: CPT | Mod: 95,,, | Performed by: PSYCHOLOGIST

## 2025-04-30 NOTE — PROGRESS NOTES
PSYCHOLOGY   Outpatient Clinic    Psychotherapy Progress Note       Patient:           Lida Reed   YOB: 2000   Date of Visit:  4/30/2025   Time of visit:  8:07-9:04 am   CPT code(s):  13140 +95 (telehealth modifier)     Diagnosis:    ICD-10-CM ICD-9-CM   1. Mild restricting type anorexia nervosa  F50.010 307.1   2. THERESA (generalized anxiety disorder)  F41.1 300.02   3. Moderate episode of recurrent major depressive disorder  F33.1 296.32   4. ADHD, predominantly inattentive type  F90.0 314.00          Problem List[1]     Individuals Present: Patient      Telehealth Information  patient's house in [address] [place]    [place] Site:   [facility]    The patient understands the limitations of telepsychology evaluations and was informed of access to in-person follow-up if desired or needed.  Patient/Family member's identity was confirmed and confidentiality/privacy confirmed prior to visit. I certify that this visit was done via secure two-way simultaneous audio and video transmission with informed consent of the patient and/or guardian.Originating Site:   Patient's home via secure telehealth virtual platform      RELEVANT HISTORY:   Relevant History : Lida is followed in psychology clinic for treatment of Anorexia Nervosa, Generalized anxiety, and depression      PROGRESS - SUBJECTIVE/OBJECTIVE:     Subjective/Objective:  Lida has met with her new psychiatrist in [place] and is planning to transition her care to providers in the [facility]. She is already established with RD in the area, and she is working to establish care with a new psychologist or psychotherapist familiar with eating disorder treatment.  Reviewed her progress during therapy thus far. She feels that she has been able to make good progress and develop more self-efficacy for her own insight as well as ability to implement interventions to keep herself  both physically and mentally healthy. She does still continue to have a pattern of withdrawing when things become difficult, and she has the most trouble asking for help when she is not doing as well. This is often in fear of judgement from others including her medical and mental health providers. And she acknowledges the need to have accountability to providers to help with this. No suicidal ideation/intent was reported or endorsed.       Treatment Modalities Used:  CBT      ASSESSMENT & PLAN:     Assessment:  Lida has recently had more symptoms of depression and worsening anorexia symptoms related to increased psychosocial stress. However, she has gained appropriate insight and already started to make positive changes to eating habits and daily routines to prioritize safety and self-care, which is a significant improvement compared to her history of previous episodes of worsened ED symptoms. She will benefit from continued interdisciplinary care for her depression, anxiety, and eating disorder. She denies thoughts of SI but is experiencing worsened depression.      Recommendations/Plan:  1.  Will continue to follow, while supporting her in transitioning to a new therapist in [place] who can support with in-person appointments.  2.  Will send update email to family and RD with updates to her status and plans for transitioning care.        Johanne Jeter, PhD, ABPP  Licensed & Board-Certified Clinical Psychologist  Pediatric Psychology  [facility]                                                 [1]   Patient Active Problem List  Diagnosis    ADHD, predominantly inattentive type    Writing learning disorder    Pain in right shin    Eating problem    Anorexia nervosa, restricting type    Psychophysiological insomnia    Moderate episode of recurrent major depressive disorder    THERESA (generalized anxiety disorder)

## 2025-05-01 ENCOUNTER — OFFICE VISIT (OUTPATIENT)
Dept: PSYCHIATRY | Facility: CLINIC | Age: 25
End: 2025-05-01
Payer: COMMERCIAL

## 2025-05-01 VITALS
HEART RATE: 103 BPM | DIASTOLIC BLOOD PRESSURE: 90 MMHG | WEIGHT: 103.19 LBS | SYSTOLIC BLOOD PRESSURE: 135 MMHG | BODY MASS INDEX: 17.71 KG/M2

## 2025-05-01 DIAGNOSIS — F90.0 ADHD, PREDOMINANTLY INATTENTIVE TYPE: ICD-10-CM

## 2025-05-01 DIAGNOSIS — F81.81 WRITING LEARNING DISORDER: ICD-10-CM

## 2025-05-01 DIAGNOSIS — F51.04 PSYCHOPHYSIOLOGICAL INSOMNIA: ICD-10-CM

## 2025-05-01 DIAGNOSIS — F41.1 GAD (GENERALIZED ANXIETY DISORDER): ICD-10-CM

## 2025-05-01 DIAGNOSIS — F33.1 MODERATE EPISODE OF RECURRENT MAJOR DEPRESSIVE DISORDER: ICD-10-CM

## 2025-05-01 DIAGNOSIS — F50.019 ANOREXIA NERVOSA, RESTRICTING TYPE, UNSPECIFIED SEVERITY: Primary | ICD-10-CM

## 2025-05-01 PROCEDURE — 1160F RVW MEDS BY RX/DR IN RCRD: CPT | Mod: CPTII,S$GLB,, | Performed by: PSYCHIATRY & NEUROLOGY

## 2025-05-01 PROCEDURE — 3075F SYST BP GE 130 - 139MM HG: CPT | Mod: CPTII,S$GLB,, | Performed by: PSYCHIATRY & NEUROLOGY

## 2025-05-01 PROCEDURE — 3008F BODY MASS INDEX DOCD: CPT | Mod: CPTII,S$GLB,, | Performed by: PSYCHIATRY & NEUROLOGY

## 2025-05-01 PROCEDURE — 99214 OFFICE O/P EST MOD 30 MIN: CPT | Mod: S$GLB,,, | Performed by: PSYCHIATRY & NEUROLOGY

## 2025-05-01 PROCEDURE — G2211 COMPLEX E/M VISIT ADD ON: HCPCS | Mod: S$GLB,,, | Performed by: PSYCHIATRY & NEUROLOGY

## 2025-05-01 PROCEDURE — 99999 PR PBB SHADOW E&M-EST. PATIENT-LVL III: CPT | Mod: PBBFAC,,, | Performed by: PSYCHIATRY & NEUROLOGY

## 2025-05-01 PROCEDURE — 3080F DIAST BP >= 90 MM HG: CPT | Mod: CPTII,S$GLB,, | Performed by: PSYCHIATRY & NEUROLOGY

## 2025-05-01 PROCEDURE — 1159F MED LIST DOCD IN RCRD: CPT | Mod: CPTII,S$GLB,, | Performed by: PSYCHIATRY & NEUROLOGY

## 2025-05-01 RX ORDER — FLUOXETINE HYDROCHLORIDE 20 MG/1
20 CAPSULE ORAL DAILY
Qty: 30 CAPSULE | Refills: 0 | Status: SHIPPED | OUTPATIENT
Start: 2025-05-01 | End: 2025-05-31

## 2025-05-01 RX ORDER — OLANZAPINE 5 MG/1
5 TABLET, FILM COATED ORAL NIGHTLY
Qty: 30 TABLET | Refills: 1 | Status: SHIPPED | OUTPATIENT
Start: 2025-05-01 | End: 2025-06-30

## 2025-05-01 NOTE — PATIENT INSTRUCTIONS

## 2025-05-01 NOTE — PROGRESS NOTES
Outpatient Psychiatry Follow-Up Visit     5/1/2025      Clinical Status of Patient:  Outpatient (Ambulatory)    Chief Complaint:  Lida Reed is a 25 y.o. female who presents today for Medication Management follow-up Visit.      Preferred Name: Lida    FIRST VISIT: This is a 25 year old single white female [employer] student who was adopted at birth. She is well known to our system since 2013 when she was about 11 years old. Extensive documentation in chart related to Neurocognitive testing, counseling, and medication treatment.   On birth, pt had gone into opioid withdrawal as her biological mother was on methadone. She did not start talking until about age 5. On formal testing, she was dx to have ADD, LD -- writing/language and adjustment disorder     She was initially treated with Vyvanse which was later changed to Methylphenidate. After age 20, she was diagnosed to have Eating disorder, restrictive type, and was involved in counseling and medication treatment. She spent about 2 1/2 months at [facility]. She was treated with Olanzapine and Prozac combination with good results. She stopped the treatment as she was doing well and decided that she did not need the treatment. Pt reports being involved in Nutritional Counseling as well as IT.      Pt is pursuing BA in Sociology at [employer]. She has very hard time with academics possibly related to her early cognitive issues, ADD and other psychiatry issues. She has taken breaks and plans to take another break and graduate next year.      She has not been hospitalized. She has not attempted suicide. No drugs or alcohol issues reported.      She may have tried Atomoxetine but she does not recall.     Her periods are regular     She denies purging or using OTC related to her eating disorder. She is not over exercising. Denies body dysmorphia.     Pt meets criteria for MDD, THERESA, Eating Disorder. ADD, and insomnia. Pt's BMI is 18.32 with wt of 48.4 kg     Plan:   Reviewed  "diagnosis and DD. Pt was not aware of LD as one of her diagnosis  Pt agrees with and would like to resume Prozac and Zyprexa.  Prozac 20 mg po daily  Zyprexa 2.5 mg po qhs  Pt is aware of side effects. Reviewed side effects  Check CBC, CMP, Vitamin B12, Folic acid, and TSH.   Pt does not have a PCP. She will seek an appointment with one through the [facility] system,  Counseling done  Pt is abstinent. She uses Barrier method when sexually active.  Continue therapy  Monitor side effect  Agrees with treatment plan    CURRENT PRESENTATION:     "Busy but good"    She has not noticed major differences    Sleep: sleeping about 5 hours. Wakes up a lot but not as much  [place]: it is not really there. Losing weight  Drinks water, boost, juice in am;   Energy: low  Side effects: denies.     New stresses: denies    Counseling: good. She had therapy and nutrition appointment yesterday. There is a new plan. She has weekly appointment with them    Work: busy -- working about 30 hours a week.  Family: good.   [employer] is good            5/30/2023     9:02 AM   GAD7   1. Feeling nervous, anxious, or on edge? 0   2. Not being able to stop or control worrying? 0   3. Worrying too much about different things? 0   4. Trouble relaxing? 0   5. Being so restless that it is hard to sit still? 0   6. Becoming easily annoyed or irritable? 0   7. Feeling afraid as if something awful might happen? 0   THERESA-7 Score 0      0-4 = Minimal anxiety  5-9 = Mild anxiety  10-14 = Moderate anxiety  15-21 = Severe anxiety             6/9/2020     1:17 PM 7/17/2019     5:34 PM   Depression Patient Health Questionnaire   Over the last two weeks how often have you been bothered by little interest or pleasure in doing things Not at all  Not at all    Over the last two weeks how often have you been bothered by feeling down, depressed or hopeless Not at all  Not at all    PHQ-2 Total Score 0 0   Over the last two weeks how often have you been bothered by trouble falling " or staying asleep, or sleeping too much  More than half the days    Over the last two weeks how often have you been bothered by feeling tired or having little energy  Several days    Over the last two weeks how often have you been bothered by a poor appetite or overeating  More than half the days    Over the last two weeks how often have you been bothered by feeling bad about yourself - or that you are a failure or have let yourself or your family down  Several days    Over the last two weeks how often have you been bothered by trouble concentrating on things, such as reading the newspaper or watching television  More than half the days    Over the last two weeks how often have you been bothered by moving or speaking so slowly that other people could have noticed. Or the opposite - being so fidgety or restless that you have been moving around a lot more than usual.  Not at all    Over the last two weeks how often have you been bothered by thoughts that you would be better off dead, or of hurting yourself  Not at all    If you checked off any problems, how difficult have these problems made it for you to do your work, take care of things at home or get along with other people?  Somewhat difficult    PHQ-9 Score  8        Data saved with a previous flowsheet row definition     0-4 = No intervention  5 to 9 = Mild  10 to 14 = Moderate  15 to 19 = Moderately severe  =20 = Severe    Review of Systems   PSYCHIATRIC: Pertinant items are noted in the narrative.    Past Medical, Family and Social History: The patient's past medical, family and social history have been reviewed and updated as appropriate within the electronic medical record - see encounter notes.    Current Medications[1]      Risk Parameters:  Patient reports no suicidal ideation  Patient reports no homicidal ideation  Patient reports no self-injurious behavior  Patient reports no violent behavior    Exam (detailed: at least 9 elements; comprehensive: all 15  "elements)   Constitutional  Vitals:  Most recent vital signs were reviewed.   Last 3 sets of Vitals        10/22/2024     8:21 AM 4/16/2025     3:19 PM 5/1/2025     2:31 PM   Vitals - 1 value per visit   SYSTOLIC 114 108 135   DIASTOLIC 74 68 90   Pulse 80 87 103   Temp 98.2 °F (36.8 °C)     Resp 17     SPO2 97 %     Weight (lb) 102.73 106.7 103.18   Weight (kg) 46.6 48.4 46.8   Height 5' 4" (1.626 m)     BMI (Calculated) 17.6     Pain Score Six            General:  younger than stated age, thin & gaunt looking     Musculoskeletal  Muscle Strength/Tone:  not examined   Gait & Station:  non-ataxic     Psychiatric  Speech:  no latency; no press, spontaneous   Behavior:    Mood & Affect:  sad  sad   Thought Process:  normal and logical   Associations:  intact   Thought Content:  normal, no suicidality, no homicidality, delusions, or paranoia, worried about her health    Insight:  has awareness of illness   Judgement: fair   Orientation:  grossly intact   Memory: intact for content of interview   Language: grossly intact   Attention Span & Concentration:  Grossly intact   Fund of Knowledge:  intact and appropriate to age and level of education     Assessment and Diagnosis   Status/Progress: Based on the examination today, the patient's problem(s) is/are inadequately controlled.  New problems have not been presented today.   Co-morbidities are complicating management of the primary condition.       Encounter Diagnoses   Name Primary?    Anorexia nervosa, restricting type, unspecified severity Yes    Psychophysiological insomnia     Moderate episode of recurrent major depressive disorder     THERESA (generalized anxiety disorder)     ADHD, predominantly inattentive type     Writing learning disorder        General Impression & Treatment Plan:     Pt reports that medication have helped her sleep better. Pt continues to lose wt. Her BMI is 17.71. lost 3 lbs since last visit.  She has weekly follow up with her therapist as well " as her nutritionist. They saw her recently and they made changes to her nutrition plan.  Pt feels that her depression is affecting her appetite and energy not so much the eating d/o.   Family supportive  No stresses    Plan:    Increase Zyprexa to 5 mg qhs  Continue prozac. Wait 1-2 weeks for more effect or consider med change.  Continue weekly therapy as well as Nutritional counseling  ?refer her to [facility] if she continues to lose weight?  Coordinate care with therapist and Nutritional counselor  Emotional support provided.   Agrees with the treatment plan     Return to Clinic: 2 weeks    Medication List with Changes/Refills   New Medications    OLANZAPINE (ZYPREXA) 5 MG TABLET    Take 1 tablet (5 mg total) by mouth every evening.   Changed and/or Refilled Medications    Modified Medication Previous Medication    FLUOXETINE 20 MG CAPSULE FLUoxetine 20 MG capsule       Take 1 capsule (20 mg total) by mouth once daily.    Take 1 capsule (20 mg total) by mouth once daily.   Discontinued Medications    OLANZAPINE (ZYPREXA) 2.5 MG TABLET    Take 1 tablet (2.5 mg total) by mouth every evening.          Time spent with pt including note preparation: 30 minutes  This includes face to face time and non-face to face time preparing to see the patient (eg, review of tests), obtaining and/or reviewing separately obtained history, documenting clinical information in the electronic or other health record, independently interpreting results and communicating results to the patient/family/caregiver, or care coordinator.         Neva Perez MD  Psychiatry         [1]   Current Outpatient Medications:     FLUoxetine 20 MG capsule, Take 1 capsule (20 mg total) by mouth once daily., Disp: 30 capsule, Rfl: 0    OLANZapine (ZYPREXA) 5 MG tablet, Take 1 tablet (5 mg total) by mouth every evening., Disp: 30 tablet, Rfl: 1

## 2025-05-15 ENCOUNTER — PATIENT MESSAGE (OUTPATIENT)
Dept: PSYCHIATRY | Facility: CLINIC | Age: 25
End: 2025-05-15
Payer: COMMERCIAL

## 2025-05-20 ENCOUNTER — OFFICE VISIT (OUTPATIENT)
Dept: URGENT CARE | Facility: CLINIC | Age: 25
End: 2025-05-20
Payer: COMMERCIAL

## 2025-05-20 VITALS
OXYGEN SATURATION: 97 % | HEIGHT: 64 IN | HEART RATE: 127 BPM | SYSTOLIC BLOOD PRESSURE: 142 MMHG | WEIGHT: 104.63 LBS | BODY MASS INDEX: 17.86 KG/M2 | RESPIRATION RATE: 16 BRPM | DIASTOLIC BLOOD PRESSURE: 95 MMHG | TEMPERATURE: 98 F

## 2025-05-20 DIAGNOSIS — R11.2 NAUSEA AND VOMITING, UNSPECIFIED VOMITING TYPE: ICD-10-CM

## 2025-05-20 DIAGNOSIS — R10.11 RIGHT UPPER QUADRANT ABDOMINAL PAIN: ICD-10-CM

## 2025-05-20 DIAGNOSIS — A08.4 VIRAL GASTROENTERITIS: Primary | ICD-10-CM

## 2025-05-20 LAB
B-HCG UR QL: NEGATIVE
BILIRUBIN, UA POC OHS: NEGATIVE
BLOOD, UA POC OHS: NEGATIVE
CLARITY, UA POC OHS: CLEAR
COLOR, UA POC OHS: YELLOW
CTP QC/QA: YES
GLUCOSE, UA POC OHS: NEGATIVE
KETONES, UA POC OHS: NEGATIVE
LEUKOCYTES, UA POC OHS: NEGATIVE
NITRITE, UA POC OHS: NEGATIVE
PH, UA POC OHS: 8.5
PROTEIN, UA POC OHS: NEGATIVE
SPECIFIC GRAVITY, UA POC OHS: 1.02
UROBILINOGEN, UA POC OHS: 0.2

## 2025-05-20 PROCEDURE — 81003 URINALYSIS AUTO W/O SCOPE: CPT | Mod: QW,S$GLB,,

## 2025-05-20 PROCEDURE — 81025 URINE PREGNANCY TEST: CPT | Mod: S$GLB,,,

## 2025-05-20 PROCEDURE — 99213 OFFICE O/P EST LOW 20 MIN: CPT | Mod: S$GLB,,,

## 2025-05-20 RX ORDER — ONDANSETRON 4 MG/1
4 TABLET, ORALLY DISINTEGRATING ORAL EVERY 6 HOURS PRN
Qty: 12 TABLET | Refills: 0 | Status: SHIPPED | OUTPATIENT
Start: 2025-05-20

## 2025-05-20 NOTE — PROGRESS NOTES
"Subjective:      Patient ID: Lida Reed is a 25 y.o. female.    Vitals:  height is 5' 4" (1.626 m) and weight is 47.4 kg (104 lb 9.7 oz). Her tympanic temperature is 98.1 °F (36.7 °C). Her blood pressure is 142/95 (abnormal) and her pulse is 127 (abnormal). Her respiration is 16 and oxygen saturation is 97%.     Chief Complaint: Vomiting    Ldia Reed is a 25 y.o. female who presents for emesis which onset 3 days ago. She notes 4 episodes of NBNB emesis over the last 3 days. Last vomit x 4 a.m. Associated sxs include nausea, abd pain, diarrhea (3 non bloody episodes), fever (Tmax 101.2 F), myalgias, chills, and sweats. Patient denies any urinary changes, constipation, hematochezia, or hematemesis. No treatments tried. Denies any old or suspicious foods. No recent travel. Sick contacts with COVID last week. No prior abdominal surgeries.    Emesis   This is a new problem. The problem has been unchanged. Maximum temperature: 101.0. Associated symptoms include abdominal pain, chills, diarrhea, a fever, myalgias and sweats. Pertinent negatives include no chest pain, coughing, dizziness or headaches. Associated symptoms comments: Light headedness  . She has tried nothing for the symptoms.       Constitution: Positive for chills and fever.   Cardiovascular:  Negative for chest pain.   Respiratory:  Negative for cough and shortness of breath.    Gastrointestinal:  Positive for abdominal pain, vomiting and diarrhea. Negative for nausea, constipation and bright red blood in stool.   Genitourinary:  Negative for dysuria, frequency, urgency and vaginal discharge.   Musculoskeletal:  Positive for muscle ache.   Neurological:  Negative for dizziness, headaches, numbness and tingling.      Objective:     Physical Exam   Constitutional: She is oriented to person, place, and time. She appears well-developed.   HENT:   Head: Normocephalic and atraumatic.   Ears:   Right Ear: External ear normal.   Left Ear: External " ear normal.   Nose: Nose normal.   Mouth/Throat: Mucous membranes are normal. Mucous membranes are moist.      Comments: MMM.  Eyes: Conjunctivae and lids are normal. No scleral icterus. Extraocular movement intact   Neck: Trachea normal. Neck supple.   Cardiovascular: Normal rate, regular rhythm and normal heart sounds.   Pulmonary/Chest: Effort normal and breath sounds normal. No respiratory distress.   Abdominal: Normal appearance and bowel sounds are normal. She exhibits no distension and no mass. Soft. There is abdominal tenderness (mild RUQ). There is no rebound, no guarding, no left CVA tenderness and no right CVA tenderness.   Musculoskeletal: Normal range of motion.         General: Normal range of motion.   Neurological: She is alert and oriented to person, place, and time. She has normal strength.   Skin: Skin is warm, dry, intact, not diaphoretic and not pale.         Comments: Normal skin turgor. 2+ radial pulse.    Psychiatric: Her speech is normal and behavior is normal. Judgment and thought content normal.   Nursing note and vitals reviewed.      Assessment:     1. Viral gastroenteritis    2. Nausea and vomiting, unspecified vomiting type    3. Right upper quadrant abdominal pain        Results for orders placed or performed in visit on 05/20/25   POCT urine pregnancy    Collection Time: 05/20/25 11:04 AM   Result Value Ref Range    POC Preg Test, Ur Negative Negative     Acceptable Yes    POCT Urinalysis(Instrument)    Collection Time: 05/20/25 11:06 AM   Result Value Ref Range    Color, POC UA Yellow Yellow, Straw, Colorless    Clarity, POC UA Clear Clear    Glucose, POC UA Negative Negative    Bilirubin, POC UA Negative Negative    Ketones, POC UA Negative Negative    Spec Grav POC UA 1.020 1.005 - 1.030    Blood, POC UA Negative Negative    pH, POC UA 8.5 5.0 - 8.0    Protein, POC UA Negative Negative    Urobilinogen, POC UA 0.2 <=1.0    Nitrite, POC UA Negative Negative    WBC,  POC UA Negative Negative       Plan:       Viral gastroenteritis    Nausea and vomiting, unspecified vomiting type  -     POCT urine pregnancy  -     ondansetron (ZOFRAN-ODT) 4 MG TbDL; Take 1 tablet (4 mg total) by mouth every 6 (six) hours as needed (for nausea/vomiting).  Dispense: 12 tablet; Refill: 0    Right upper quadrant abdominal pain  -     POCT Urinalysis(Instrument)      VSS. Afebrile. No signs of dehydration. Patient is in NAD.  No acute abdomen on physical exam.   UA unremarkable. UPT negative. DDx: cholecystitis  Meds: zofran sent to preferred pharmacy  Recommend supportive measures.  Drink plenty of fluids.   Patient encouraged to eat as tolerated.  Return to clinic if symptoms do not resolve in the next couple of days. Discussed signs of dehydration with patient.  ER precautions given including: fever, abdominal distention, bloody diarrhea, nonintractable vomiting/diarrhea.   Patient verbalized understanding and agrees with treatment plan.

## 2025-05-20 NOTE — LETTER
May 20, 2025      [facility] [address] [place] [address]  Phone: [phone]  Fax: [phone]       Patient: Lida Reed   YOB: 2000  Date of Visit: 05/20/2025    To Whom It May Concern:    Eloisa Reed  was at [facility] on 05/20/2025. The patient may return to work/school on 05/22/2025 with no restrictions. If you have any questions or concerns, or if I can be of further assistance, please do not hesitate to contact me.    Sincerely,    Apple Lora PA-C

## 2025-06-08 NOTE — PROGRESS NOTES
"Psychiatry Initial Visit (PhD/LCSW)  Diagnostic Interview - CPT 94991    Date: 6/9/2025    Site: [place]    Referral source: Aaareferral Self    Clinical status of patient: Outpatient    Lida Reed, a 25 y.o. female, for initial evaluation visit.  Met with patient.    Chief complaint/reason for encounter: depression and anxiety    History of present illness: Patient referred to counseling by Dr. Perez whose evaluation on 4/16/25 was reviewed.  Relevant history from that evaluation is below:     History of Present Illness:   "I been dealing with eating disorder for 5 years." Johanne Jeter in [place] is her therapist since 2020. She focuses on pediatrics. They are switching her to an adult therapist. "My dad is helping me."  She does not have a psychiatrist.  C/o having lot of depression and anxiety     Stressors:  Work -- [employer]. Full time job. 7:30 to 3:30. Looking for a new job. Likes what she is doing but wants to change employers. Doing this since last June.   School -- Sociology Major -- 5th year. She may graduate in May 2026. Very stressful. Always struggled with school. Never enjoyed it. Feels burnt out from school. She is getting advise from the school and her parents. The plan is to wait.     Past Psychiatric Hx:  Age at first treatment was 5th grade. ADHD. She had Vyvanse for a while. Changed to concerta due to weight loss. Weight improved. Stopped meds in Que year high school.   Counseling   In college things changed.   Her weight was always low in school. Doing cross country and track every day. Stopped this in college. Restrictive eating disorder became worse.   Depression/anxiety --   She was in [facility] -- 2 and 1/2 months. It was very helpful. She was in family based therapy with her parents around age 21. Affected her relationship with her parents. Physically she was well mentally not so. Relapsed with her eating disorder and pt decided to go to [facility] " "to prevent parents to take control of her decision making.  She was prescribed stattera.  But pt is not sure.      Prozac (20 mg) and zyprexa (10 mg) worked well. She was on it for 4 months. She felt she was "cured" and stopped taking it. Stopped 2 years ago. She drifting again one year ago.   Her nutritionist, therapist, and parents advised her to get back on medications.     Eating disorder: anorexic. Restriction. No purging. No over activity or using meds for diarrhea.   NO suicide attempt  NO psych adm            Abuse: denies     Family Psychiatric Hx:  Uncle passed away -- suicide  Mom-- eating disorder   Sister --eating disorder -- 11 months younger. She is doing well .   None of the above members are biologically connected to her     Father: ; used to work at [employer] -  of marketing;   Mom:  and not practicing.  Good relation with parents.  Sibs: one older brother and a younger sister.     Personal/Psychosocial Hx:   Birth Hx: [place]. ADOPTED -- at age 2 weeks. Birth mom did drugs when she was pregnant with pt.   Does not know about her dad. Closed adoption.   Per Dr. Armendariz's note: She went to detox right after birth as her mother was taking methadone during pregnancy. She had tubes placed in her ears twice. She didn't start talking until age 5, an early sign of what is happening now. No other significant medical problems.      Education:  Enrolled at [employer] [place]   Occupation: Work as above     Marital status: Single; not in a relationship. Broke up but they are good friends. No children     Living situation: Lives in a house with 3 room-mates and 3 dogs. Good with her room mates.      Roman Catholic: Hindu. Practices.      Legal: Denies   Alcohol & Drug use History:   Alcohol: age at first use: 18. Last drink -- 4 days ago. On the weekends -- Wine/cocktail. Denies being a problems.      MJ -- denies.   Meth/cocaine/pills -- denies      Medical: denies  Periods: Very regular; taking " "precautions      Current visit: Patient reports she is in recovery for an eating disorder that started about four years ago. Sees a nutritionist twice weekly to manage symptoms.  States the condition is in recovery.    Patient reports "non stop" panic attacks that are occurring more frequently over the past year.  Admits to spiraling, intrusive worry about minor things. Possible triggers include the ending of a four year relationship.  States the relationship was not healthy.  Also finds stress from not graduating yet.  Compares herself to peers who are already done with school.  "Fell stuck" Majoring in sociology with plans to get her masters in social work.  She has about one year left.  States this is a fairly significant stressors for her currently.  Complicating this matter is the fact that her parents stopped helping her financially around December of 2024 due to poor performance in school.  She now has to pay her own rent and take out loans for school.  She works full-time to meet her financial obligations, but is very concerned about balancing work and school when she returns in the fall.  States her parents have indicated they may be willing to help her again so she can drop to part-time at work.    Patient states much of her anxiety comes from trouble with identity and self-trust.  She is adopted.  Feels unsettled by not knowing much about "where she comes from."    Patient reports family dynamics were very strained during the height of her eating disorder, but have been improving recently.  Parents were in charge of her eating schedule at one time which created extreme tension.  She eventually entered a program in [place] to remove her parents' control.  Parents  for a year due to the stress of the illness but have since reconciled.    Symptoms:   Mood: poor concentration  Anxiety: excessive anxiety/worry, restlessness/keyed up, and panic attacks  Substance abuse: denied  Cognitive functioning: " denied  Health behaviors: noncontributory    SI/HI and AVH/D (per Epic EMR/pt report; current and/or historical):   Pt Denied  SI (per Epic EMR/pt report; current and/or historical): Pt Denied  Attempts (per Epic EMR/pt report; current and/or historical): Pt Denied  HI/Violence (per Epic EMR/pt report; current and/or historical): Pt Denied  Heydi/AVH/D (per Epic EMR/pt report; current and/or historical): Pt Denied    Self-Harm (per Epic EMR/pt report; current and/or historical): Pt Denied    Outpatient Therapy (per Epic EMR/pt report; current and/or historical): counseling through [facility].  Pediatric provider, so patient is seeking care with an adult therapist    Outpatient Psychiatric Med Management (per Epic EMR/pt report; current and/or historical): Dr. 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       Education Level: [employer]- sociology major    Temple / Spiritual: [employer]      Marital Status: single  Relationship Quality: n/a        Family:    Place of Birth: [place]  Place Reared: [place]    Parent's Marital Status:   Mother's Occupation: interior design- not working currently  Father's Occupation:     Mother Relationship Quality: improving  Father Relationship Quality: improving  Mother ?: Pt Denied  Father ?:Pt Denied    Stepparents?:    Maternal Stepparents: Pt Denied   Paternal Stepparents: Pt Denied    Siblings  (biological, half, step, and/or adopted):    Biological: Patient is adopted.  She has an older brother- lives in [place]- 5 years older than her; and younger sister- 11 months younger.  They are biological children of her adopted parents.    Adopted: Yes.  Adopted as an infant.  Always knew she was adopted    Children & Ages:    Daughters: Pt Denied   Sons: Pt Denied   Stepdaughters: Pt Denied   Stepsons: Pt Denied    Grandchildren & Ages:   Granddaughters: Pt Denied   Grandsons: Pt Denied    Family history of psychiatric illness/substance abuse:   Father: Pt Denied  Mother: eating disorder  Sibling(s): sister- eating disorder  Maternal Grandmother: Pt Denied  Maternal Grandfather: Pt Denied  Paternal Grandmother: Pt Denied  Paternal Grandfather: Pt Denied  Child(chucky): Pt Denied    Trauma/Abuse/Neglect:   Pt Denied/None Noted    Physical: Pt Denied/None Noted  Verbal: Pt Denied/None Noted  Sexual: Pt Denied/None Noted  Other Trauma: Pt Denied/None Noted    Legal/Criminal Hx: Pt Denied/None Noted    Medical history:  eating disorder- in recovery    Current medications and drug reactions (include OTC, herbal): see medication list    Strengths and liabilities: Strength: Patient accepts guidance/feedback, Strength: Patient is expressive/articulate.    Current Evaluation:     Mental Status Exam:  General Appearance:  unremarkable, age appropriate   Speech: normal tone, normal rate, normal pitch, normal volume      Level of Cooperation: guarded      Thought Processes: normal and logical   Mood: steady      Thought Content: normal, no suicidality, no homicidality, delusions, or paranoia   Affect: congruent and appropriate   Orientation: Oriented x3   Memory: recent >  intact, remote >  intact   Attention Span & Concentration: intact   Fund of General Knowledge: intact and appropriate to age and level of education   Abstract Reasoning: Not formally assessed   Judgment & Insight: good     Language intact     Diagnostic  Impression - Plan:     Diagnosis:   1. THERESA (generalized anxiety disorder)    2. Anorexia nervosa, restricting type, unspecified severity        Plan:individual psychotherapy Patient is aware that I am not an eating disorder specialist.  She feels with the support of her bi-weekly nutritionist, that her eating disorder is well-managed.  She would like counseling to work on current stressors and coping skill development.    Return to Clinic: 2 weeks    Length of Service (minutes): 45

## 2025-06-09 ENCOUNTER — OFFICE VISIT (OUTPATIENT)
Dept: PSYCHIATRY | Facility: CLINIC | Age: 25
End: 2025-06-09
Payer: COMMERCIAL

## 2025-06-09 DIAGNOSIS — F50.019 ANOREXIA NERVOSA, RESTRICTING TYPE, UNSPECIFIED SEVERITY: ICD-10-CM

## 2025-06-09 DIAGNOSIS — F41.1 GAD (GENERALIZED ANXIETY DISORDER): Primary | ICD-10-CM

## 2025-06-09 PROCEDURE — 90791 PSYCH DIAGNOSTIC EVALUATION: CPT | Mod: S$GLB,,, | Performed by: SOCIAL WORKER

## 2025-06-14 ENCOUNTER — PATIENT MESSAGE (OUTPATIENT)
Dept: PSYCHIATRY | Facility: CLINIC | Age: 25
End: 2025-06-14
Payer: COMMERCIAL

## 2025-07-15 ENCOUNTER — PATIENT MESSAGE (OUTPATIENT)
Dept: PRIMARY CARE CLINIC | Facility: CLINIC | Age: 25
End: 2025-07-15
Payer: COMMERCIAL

## 2025-07-23 ENCOUNTER — PATIENT MESSAGE (OUTPATIENT)
Dept: PSYCHIATRY | Facility: CLINIC | Age: 25
End: 2025-07-23
Payer: COMMERCIAL

## 2025-07-24 ENCOUNTER — OFFICE VISIT (OUTPATIENT)
Dept: URGENT CARE | Facility: CLINIC | Age: 25
End: 2025-07-24
Payer: COMMERCIAL

## 2025-07-24 VITALS
HEIGHT: 64 IN | HEART RATE: 80 BPM | TEMPERATURE: 98 F | SYSTOLIC BLOOD PRESSURE: 120 MMHG | OXYGEN SATURATION: 97 % | BODY MASS INDEX: 17.8 KG/M2 | RESPIRATION RATE: 18 BRPM | DIASTOLIC BLOOD PRESSURE: 87 MMHG | WEIGHT: 104.25 LBS

## 2025-07-24 DIAGNOSIS — J06.9 VIRAL UPPER RESPIRATORY INFECTION: Primary | ICD-10-CM

## 2025-07-24 DIAGNOSIS — J02.9 SORE THROAT: ICD-10-CM

## 2025-07-24 LAB
CTP QC/QA: YES
CTP QC/QA: YES
MOLECULAR STREP A: NEGATIVE
SARS-COV+SARS-COV-2 AG RESP QL IA.RAPID: NEGATIVE

## 2025-07-24 PROCEDURE — 99213 OFFICE O/P EST LOW 20 MIN: CPT | Mod: S$GLB,,,

## 2025-07-24 PROCEDURE — 87651 STREP A DNA AMP PROBE: CPT | Mod: QW,S$GLB,,

## 2025-07-24 PROCEDURE — 87811 SARS-COV-2 COVID19 W/OPTIC: CPT | Mod: QW,S$GLB,,

## 2025-07-24 NOTE — LETTER
July 24, 2025      [facility] [address] [place] [address]  Phone: [phone]  Fax: [phone]       Patient: Lida Reed   YOB: 2000  Date of Visit: 07/24/2025    To Whom It May Concern:    Eloisa Reed  was at [facility] on 07/24/2025. The patient may return to work/school on 07/28/2025 with no restrictions. If you have any questions or concerns, or if I can be of further assistance, please do not hesitate to contact me.    Sincerely,    Apple Lora PA-C

## 2025-07-24 NOTE — PATIENT INSTRUCTIONS
Recommend retest for COVID-19 in 48 hours to confirm negative test.    PLEASE READ YOUR DISCHARGE INSTRUCTIONS ENTIRELY AS IT CONTAINS IMPORTANT INFORMATION.    Please drink plenty of fluids.    Please get plenty of rest.    Please return here or go to the Emergency Department for any concerns or worsening of condition.    Please take an over the counter antihistamine medication (Allegra/Claritin/Zyrtec) of your choice as directed for allergy symptoms and/or runny nose and postnasal drip.    Try an over the counter decongestant for sinus pressure/ear pressure, congestion symptoms like Mucinex D or Sudafed or Phenylephrine. You buy this behind the pharmacy counter.    If you do have Hypertension or palpitations, it is safe to take Coricidin HBP for relief of sinus symptoms.    Tylenol or ibuprofen can also be used as directed for pain and fever unless you have an allergy to them or medical condition such as stomach ulcers, kidney or liver disease or blood thinners etc for which you should not be taking these type of medications.     Sore throat recommendations: Warm fluids, warm salt water gargles, throat lozenges, tea, honey, soup, rest, hydration.    Use over the counter Flonase or Nasocort: one spray each nostril twice daily OR two sprays each nostril once daily until nares dry out, unless you have Glaucoma.   Can also supplement with nasal saline rinse.    Sinus rinses DO NOT USE TAP WATER, if you must, water must be at a rolling boil for 1 minute, let it cool, then use.  May use distilled water, or over the counter nasal saline rinses.  Jean-Paul's vapor rub in shower to help open nasal passages.  May use nasal gel to keep passages moisturized.  May use nasal saline sprays during the day for added relief of congestion.   For those who go to the gym, please do not use the sauna or steam room now to clear sinuses.    Cough     Rest and fluids are important  Can use honey with vijay to soothe your throat    Robitussin  or Delsyum for cough suppressant for dry cough.    Mucinex DM or products containing Guaifenesin or Dextromethorphan for expectorant (wet cough).    Take prescription cough meds (pills) as prescribed; take prescription cough syrup at night as needed for cough.  Do not take both the prescribed cough pills and syrup at the same time or within 6 hours of each other.  Do not take the cough syrup with any other sedative medication as it can can cause drowsiness. Do not operate any heavy machinery, drink or drive while taking the cough syrup.     Please follow up with your primary care doctor or specialist in the next 48-72hrs as needed and if no improvement    If you smoke, please stop smoking.    Please return or see your primary care doctor if you develop new or worsening symptoms.     Lastly, good hand washing and cough hygiene (cough into your elbow) will help prevent the spread of the illness. A general rule is that you are no longer contagious once you have been without a fever for over 24 hours without requiring fever reducing medications.     Please arrange follow up with your primary medical clinic as soon as possible. You must understand that you've received an Urgent Care treatment only and that you may be released before all of your medical problems are known or treated. You, the patient, will arrange for follow up as instructed. If your symptoms worsen or fail to improve you should go to the Emergency Room.

## 2025-07-24 NOTE — PROGRESS NOTES
"Subjective:      Patient ID: Lida Reed is a 25 y.o. female.    Vitals:  height is 5' 4" (1.626 m) and weight is 47.3 kg (104 lb 4.4 oz). Her tympanic temperature is 97.7 °F (36.5 °C). Her blood pressure is 120/87 and her pulse is 80. Her respiration is 18 and oxygen saturation is 97%.     Chief Complaint: Sore Throat    Lida Reed is a 25 y.o. female who presents for sore throat which onset yesterday. Associated sxs include fever (Tmax 100.3 F), myalgias, non productive cough, ear pain, and HA. Patient denies any chills, SOB, CP, abd pain, n/v/d, rash, dizziness, or numbness/tingling. Prior Tx includes advil, tylenol, and cough drops. States strep went around work last week.     Sore Throat   This is a new problem. The current episode started yesterday. The problem has been gradually worsening. Neither side of throat is experiencing more pain than the other. There has been no fever. The pain is at a severity of 6/10. The pain is moderate. Associated symptoms include coughing, ear pain and headaches. Pertinent negatives include no abdominal pain, congestion, diarrhea, drooling, ear discharge, hoarse voice, plugged ear sensation, neck pain, shortness of breath, stridor, swollen glands, trouble swallowing or vomiting. She has had exposure to strep. She has had no exposure to mono. Treatments tried: advil. The treatment provided mild relief.       Constitution: Negative for appetite change, chills, sweating, fatigue and fever.   HENT:  Positive for ear pain and sore throat. Negative for ear discharge, hearing loss, drooling, congestion, postnasal drip, sinus pain, sinus pressure and trouble swallowing.    Neck: Negative for neck pain.   Cardiovascular:  Negative for chest pain.   Respiratory:  Positive for cough. Negative for sputum production, shortness of breath and stridor.    Gastrointestinal:  Negative for abdominal pain, nausea, vomiting and diarrhea.   Musculoskeletal:  Negative for muscle " ache.   Neurological:  Positive for headaches. Negative for dizziness, numbness and tingling.      Objective:     Physical Exam   Constitutional: She is oriented to person, place, and time. She appears well-developed. She is cooperative.  Non-toxic appearance. She does not appear ill. No distress.   HENT:   Head: Normocephalic and atraumatic.   Ears:   Right Ear: Hearing, tympanic membrane, external ear and ear canal normal.   Left Ear: Hearing, tympanic membrane, external ear and ear canal normal.   Nose: Nose normal. No mucosal edema or nasal deformity. No epistaxis. Right sinus exhibits no maxillary sinus tenderness and no frontal sinus tenderness. Left sinus exhibits no maxillary sinus tenderness and no frontal sinus tenderness.   Mouth/Throat: Uvula is midline and mucous membranes are normal. Mucous membranes are moist. No trismus in the jaw. Normal dentition. No uvula swelling. Posterior oropharyngeal erythema present. No oropharyngeal exudate or posterior oropharyngeal edema. Tonsils are 1+ on the right. Tonsils are 1+ on the left. No tonsillar exudate.   Eyes: Conjunctivae and lids are normal. No scleral icterus. Extraocular movement intact   Neck: Trachea normal and phonation normal. Neck supple. No edema present. No erythema present. No neck rigidity present.   Cardiovascular: Normal rate, regular rhythm, normal heart sounds and normal pulses.   Pulmonary/Chest: Effort normal and breath sounds normal. No stridor. No respiratory distress. She has no decreased breath sounds. She has no wheezes. She has no rhonchi. She has no rales.   Abdominal: Normal appearance.   Musculoskeletal: Normal range of motion.         General: No deformity. Normal range of motion.   Lymphadenopathy:     She has no cervical adenopathy.   Neurological: She is alert and oriented to person, place, and time. She exhibits normal muscle tone. Coordination normal.   Skin: Skin is warm, dry, intact, not diaphoretic and not pale.    Psychiatric: Her speech is normal and behavior is normal. Judgment and thought content normal.   Nursing note and vitals reviewed.      Assessment:     1. Viral upper respiratory infection    2. Sore throat        Results for orders placed or performed in visit on 07/24/25   SARS Coronavirus 2 Antigen, POCT Manual Read    Collection Time: 07/24/25 12:26 PM   Result Value Ref Range    SARS Coronavirus 2 Antigen Negative Negative, Presumptive Negative     Acceptable Yes    POCT Strep A, Molecular    Collection Time: 07/24/25 12:30 PM   Result Value Ref Range    Molecular Strep A, POC Negative Negative     Acceptable Yes        Plan:       Viral upper respiratory infection    Sore throat  -     SARS Coronavirus 2 Antigen, POCT Manual Read  -     POCT Strep A, Molecular            Medical Decision Making:   Clinical Tests:   Lab Tests: Ordered and Reviewed       <> Summary of Lab: Negative COVID and strep.   Urgent Care Management:  Afebrile. VSS. Patient is in NAD. Discussed negative results with patient. High suspicion for COVID. Recommend patient retest for COVID-19 in 48 hours to confirm negative test. Educated patient on viral vs bacterial sinus infection/upper respiratory infection. Supportive care for sore throat (salt water gargles, lozenges, chloraseptic spray, cough drops, warm tea, honey, etc.). Recommend OTC decongestant. Increase fluid intake and plenty of rest. Tylenol/Ibuprofen (as permitted) as needed for any pain or discomfort. If symptoms do not resolve, return to clinic for further evaluation. ER precautions given such as SOB, CP, or fever not resolved with fever-reducing medications.

## 2025-08-15 ENCOUNTER — PATIENT MESSAGE (OUTPATIENT)
Dept: PSYCHIATRY | Facility: CLINIC | Age: 25
End: 2025-08-15
Payer: COMMERCIAL

## 2025-08-18 ENCOUNTER — OFFICE VISIT (OUTPATIENT)
Dept: URGENT CARE | Facility: CLINIC | Age: 25
End: 2025-08-18
Payer: COMMERCIAL

## 2025-08-18 VITALS
SYSTOLIC BLOOD PRESSURE: 129 MMHG | HEART RATE: 77 BPM | DIASTOLIC BLOOD PRESSURE: 84 MMHG | WEIGHT: 109.88 LBS | RESPIRATION RATE: 18 BRPM | TEMPERATURE: 99 F | OXYGEN SATURATION: 97 % | HEIGHT: 64 IN | BODY MASS INDEX: 18.76 KG/M2

## 2025-08-18 DIAGNOSIS — R52 BODY ACHES: ICD-10-CM

## 2025-08-18 DIAGNOSIS — J06.9 VIRAL URI WITH COUGH: Primary | ICD-10-CM

## 2025-08-18 DIAGNOSIS — J02.9 SORE THROAT: ICD-10-CM

## 2025-08-18 PROCEDURE — 87651 STREP A DNA AMP PROBE: CPT | Mod: QW,S$GLB,, | Performed by: PHYSICIAN ASSISTANT

## 2025-08-18 PROCEDURE — 87811 SARS-COV-2 COVID19 W/OPTIC: CPT | Mod: QW,S$GLB,, | Performed by: PHYSICIAN ASSISTANT

## 2025-08-18 PROCEDURE — 99213 OFFICE O/P EST LOW 20 MIN: CPT | Mod: S$GLB,,, | Performed by: PHYSICIAN ASSISTANT
